# Patient Record
Sex: MALE | Race: OTHER | NOT HISPANIC OR LATINO | URBAN - METROPOLITAN AREA
[De-identification: names, ages, dates, MRNs, and addresses within clinical notes are randomized per-mention and may not be internally consistent; named-entity substitution may affect disease eponyms.]

---

## 2017-04-28 ENCOUNTER — INPATIENT (INPATIENT)
Facility: HOSPITAL | Age: 82
LOS: 2 days | Discharge: HOME HEALTH | DRG: 292 | End: 2017-05-01
Attending: INTERNAL MEDICINE | Admitting: INTERNAL MEDICINE
Payer: MEDICARE

## 2017-04-28 VITALS
HEIGHT: 70 IN | SYSTOLIC BLOOD PRESSURE: 112 MMHG | HEART RATE: 78 BPM | OXYGEN SATURATION: 94 % | DIASTOLIC BLOOD PRESSURE: 63 MMHG | WEIGHT: 160.06 LBS | TEMPERATURE: 97 F | RESPIRATION RATE: 18 BRPM

## 2017-04-28 DIAGNOSIS — R74.8 ABNORMAL LEVELS OF OTHER SERUM ENZYMES: ICD-10-CM

## 2017-04-28 DIAGNOSIS — E78.00 PURE HYPERCHOLESTEROLEMIA, UNSPECIFIED: ICD-10-CM

## 2017-04-28 DIAGNOSIS — Z29.9 ENCOUNTER FOR PROPHYLACTIC MEASURES, UNSPECIFIED: ICD-10-CM

## 2017-04-28 DIAGNOSIS — I10 ESSENTIAL (PRIMARY) HYPERTENSION: ICD-10-CM

## 2017-04-28 DIAGNOSIS — E87.1 HYPO-OSMOLALITY AND HYPONATREMIA: ICD-10-CM

## 2017-04-28 DIAGNOSIS — R06.00 DYSPNEA, UNSPECIFIED: ICD-10-CM

## 2017-04-28 DIAGNOSIS — I50.9 HEART FAILURE, UNSPECIFIED: ICD-10-CM

## 2017-04-28 DIAGNOSIS — R63.8 OTHER SYMPTOMS AND SIGNS CONCERNING FOOD AND FLUID INTAKE: ICD-10-CM

## 2017-04-28 DIAGNOSIS — R79.89 OTHER SPECIFIED ABNORMAL FINDINGS OF BLOOD CHEMISTRY: ICD-10-CM

## 2017-04-28 LAB
ALBUMIN SERPL ELPH-MCNC: 3.3 G/DL — LOW (ref 3.4–5)
ALP SERPL-CCNC: 183 U/L — HIGH (ref 40–120)
ALT FLD-CCNC: 76 U/L — HIGH (ref 12–42)
ANION GAP SERPL CALC-SCNC: 12 MMOL/L — SIGNIFICANT CHANGE UP (ref 9–16)
ANION GAP SERPL CALC-SCNC: 14 MMOL/L — SIGNIFICANT CHANGE UP (ref 9–16)
APPEARANCE UR: CLEAR — SIGNIFICANT CHANGE UP
APTT BLD: 28.6 SEC — SIGNIFICANT CHANGE UP (ref 27.5–37.4)
AST SERPL-CCNC: 54 U/L — HIGH (ref 15–37)
BASOPHILS NFR BLD AUTO: 0.3 % — SIGNIFICANT CHANGE UP (ref 0–2)
BILIRUB SERPL-MCNC: 1.4 MG/DL — HIGH (ref 0.2–1.2)
BILIRUB UR-MCNC: NEGATIVE — SIGNIFICANT CHANGE UP
BUN SERPL-MCNC: 12 MG/DL — SIGNIFICANT CHANGE UP (ref 7–23)
BUN SERPL-MCNC: 13 MG/DL — SIGNIFICANT CHANGE UP (ref 7–23)
CALCIUM SERPL-MCNC: 8.1 MG/DL — LOW (ref 8.5–10.5)
CALCIUM SERPL-MCNC: 8.2 MG/DL — LOW (ref 8.5–10.5)
CHLORIDE SERPL-SCNC: 81 MMOL/L — LOW (ref 96–108)
CHLORIDE SERPL-SCNC: 86 MMOL/L — LOW (ref 96–108)
CHLORIDE UR-SCNC: 159 MMOL/L — SIGNIFICANT CHANGE UP
CK MB BLD-MCNC: 3.11 % — SIGNIFICANT CHANGE UP
CK MB CFR SERPL CALC: 5.6 NG/ML — HIGH (ref 0.5–3.6)
CK SERPL-CCNC: 180 U/L — SIGNIFICANT CHANGE UP (ref 39–308)
CO2 SERPL-SCNC: 22 MMOL/L — SIGNIFICANT CHANGE UP (ref 22–31)
CO2 SERPL-SCNC: 24 MMOL/L — SIGNIFICANT CHANGE UP (ref 22–31)
COLOR SPEC: YELLOW — SIGNIFICANT CHANGE UP
CREAT ?TM UR-MCNC: 38.5 MG/DL — SIGNIFICANT CHANGE UP
CREAT SERPL-MCNC: 0.79 MG/DL — SIGNIFICANT CHANGE UP (ref 0.5–1.3)
CREAT SERPL-MCNC: 0.82 MG/DL — SIGNIFICANT CHANGE UP (ref 0.5–1.3)
DIFF PNL FLD: NEGATIVE — SIGNIFICANT CHANGE UP
EOSINOPHIL NFR BLD AUTO: 1.3 % — SIGNIFICANT CHANGE UP (ref 0–6)
GLUCOSE SERPL-MCNC: 102 MG/DL — HIGH (ref 70–99)
GLUCOSE SERPL-MCNC: 94 MG/DL — SIGNIFICANT CHANGE UP (ref 70–99)
GLUCOSE UR QL: NEGATIVE — SIGNIFICANT CHANGE UP
HCT VFR BLD CALC: 35.4 % — LOW (ref 39–50)
HGB BLD-MCNC: 12.4 G/DL — LOW (ref 13–17)
INR BLD: 1.22 — HIGH (ref 0.88–1.16)
KETONES UR-MCNC: NEGATIVE — SIGNIFICANT CHANGE UP
LEUKOCYTE ESTERASE UR-ACNC: NEGATIVE — SIGNIFICANT CHANGE UP
LYMPHOCYTES # BLD AUTO: 7.1 % — LOW (ref 13–44)
MAGNESIUM SERPL-MCNC: 1.8 MG/DL — SIGNIFICANT CHANGE UP (ref 1.6–2.4)
MCHC RBC-ENTMCNC: 32.6 PG — SIGNIFICANT CHANGE UP (ref 27–34)
MCHC RBC-ENTMCNC: 35 G/DL — SIGNIFICANT CHANGE UP (ref 32–36)
MCV RBC AUTO: 93.2 FL — SIGNIFICANT CHANGE UP (ref 80–100)
MONOCYTES NFR BLD AUTO: 11.1 % — SIGNIFICANT CHANGE UP (ref 2–14)
NEUTROPHILS NFR BLD AUTO: 80.2 % — HIGH (ref 43–77)
NITRITE UR-MCNC: NEGATIVE — SIGNIFICANT CHANGE UP
NT-PROBNP SERPL-SCNC: 3872 PG/ML — HIGH
OSMOLALITY UR: 427 MOSMOL/KG — SIGNIFICANT CHANGE UP (ref 100–650)
PH UR: 6 — SIGNIFICANT CHANGE UP (ref 5–8)
PLATELET # BLD AUTO: 179 K/UL — SIGNIFICANT CHANGE UP (ref 150–400)
POTASSIUM SERPL-MCNC: 3.5 MMOL/L — SIGNIFICANT CHANGE UP (ref 3.5–5.3)
POTASSIUM SERPL-MCNC: 4.3 MMOL/L — SIGNIFICANT CHANGE UP (ref 3.5–5.3)
POTASSIUM SERPL-SCNC: 3.5 MMOL/L — SIGNIFICANT CHANGE UP (ref 3.5–5.3)
POTASSIUM SERPL-SCNC: 4.3 MMOL/L — SIGNIFICANT CHANGE UP (ref 3.5–5.3)
POTASSIUM UR-SCNC: 36 MMOL/L — SIGNIFICANT CHANGE UP
PROT SERPL-MCNC: 6.9 G/DL — SIGNIFICANT CHANGE UP (ref 6.4–8.2)
PROT UR-MCNC: NEGATIVE MG/DL — SIGNIFICANT CHANGE UP
PROTHROM AB SERPL-ACNC: 13.6 SEC — HIGH (ref 9.8–12.7)
RBC # BLD: 3.8 M/UL — LOW (ref 4.2–5.8)
RBC # FLD: 13.8 % — SIGNIFICANT CHANGE UP (ref 10.3–16.9)
SODIUM SERPL-SCNC: 119 MMOL/L — CRITICAL LOW (ref 135–145)
SODIUM SERPL-SCNC: 120 MMOL/L — CRITICAL LOW (ref 135–145)
SODIUM UR-SCNC: 175 MMOL/L — SIGNIFICANT CHANGE UP
SP GR SPEC: 1.01 — SIGNIFICANT CHANGE UP (ref 1–1.03)
TROPONIN I SERPL-MCNC: 0.07 NG/ML — HIGH (ref 0.01–0.04)
TROPONIN I SERPL-MCNC: 0.07 NG/ML — HIGH (ref 0.01–0.04)
TSH SERPL-MCNC: 2.41 UIU/ML — SIGNIFICANT CHANGE UP (ref 0.35–4.94)
UROBILINOGEN FLD QL: 0.2 E.U./DL — SIGNIFICANT CHANGE UP
UUN UR-MCNC: 268 MG/DL — SIGNIFICANT CHANGE UP
WBC # BLD: 6.3 K/UL — SIGNIFICANT CHANGE UP (ref 3.8–10.5)
WBC # FLD AUTO: 6.3 K/UL — SIGNIFICANT CHANGE UP (ref 3.8–10.5)

## 2017-04-28 PROCEDURE — 93010 ELECTROCARDIOGRAM REPORT: CPT

## 2017-04-28 PROCEDURE — 99223 1ST HOSP IP/OBS HIGH 75: CPT

## 2017-04-28 PROCEDURE — 99285 EMERGENCY DEPT VISIT HI MDM: CPT | Mod: 25

## 2017-04-28 PROCEDURE — 71010: CPT | Mod: 26

## 2017-04-28 RX ORDER — FUROSEMIDE 40 MG
40 TABLET ORAL
Qty: 0 | Refills: 0 | Status: DISCONTINUED | OUTPATIENT
Start: 2017-04-28 | End: 2017-04-29

## 2017-04-28 RX ORDER — POTASSIUM CHLORIDE 20 MEQ
40 PACKET (EA) ORAL ONCE
Qty: 0 | Refills: 0 | Status: COMPLETED | OUTPATIENT
Start: 2017-04-28 | End: 2017-04-29

## 2017-04-28 RX ORDER — HEPARIN SODIUM 5000 [USP'U]/ML
5000 INJECTION INTRAVENOUS; SUBCUTANEOUS EVERY 8 HOURS
Qty: 0 | Refills: 0 | Status: DISCONTINUED | OUTPATIENT
Start: 2017-04-28 | End: 2017-04-29

## 2017-04-28 RX ORDER — FUROSEMIDE 40 MG
40 TABLET ORAL ONCE
Qty: 0 | Refills: 0 | Status: COMPLETED | OUTPATIENT
Start: 2017-04-28 | End: 2017-04-28

## 2017-04-28 RX ORDER — SODIUM CHLORIDE 9 MG/ML
3 INJECTION INTRAMUSCULAR; INTRAVENOUS; SUBCUTANEOUS ONCE
Qty: 0 | Refills: 0 | Status: COMPLETED | OUTPATIENT
Start: 2017-04-28 | End: 2017-04-28

## 2017-04-28 RX ORDER — DOCUSATE SODIUM 100 MG
100 CAPSULE ORAL THREE TIMES A DAY
Qty: 0 | Refills: 0 | Status: DISCONTINUED | OUTPATIENT
Start: 2017-04-28 | End: 2017-05-01

## 2017-04-28 RX ORDER — SENNA PLUS 8.6 MG/1
2 TABLET ORAL AT BEDTIME
Qty: 0 | Refills: 0 | Status: DISCONTINUED | OUTPATIENT
Start: 2017-04-28 | End: 2017-05-01

## 2017-04-28 RX ADMIN — Medication 40 MILLIGRAM(S): at 13:57

## 2017-04-28 RX ADMIN — SODIUM CHLORIDE 3 MILLILITER(S): 9 INJECTION INTRAMUSCULAR; INTRAVENOUS; SUBCUTANEOUS at 13:15

## 2017-04-28 RX ADMIN — HEPARIN SODIUM 5000 UNIT(S): 5000 INJECTION INTRAVENOUS; SUBCUTANEOUS at 21:43

## 2017-04-28 RX ADMIN — Medication 40 MILLIGRAM(S): at 21:43

## 2017-04-28 RX ADMIN — Medication 100 MILLIGRAM(S): at 21:43

## 2017-04-28 NOTE — CONSULT NOTE ADULT - PROBLEM SELECTOR RECOMMENDATION 9
Add on Serum Osmolality, TSH  Urine indices added on    Suspect decreased effective arterial volume from CHF leading to an ADH effect.   Anticipate improvement in serum sodium     Would check BMP q8 hours while being diuresed (see below)    Rate of sodium increase should be no more than 8mEq/L in 24 hours  No overt neurological symptoms right now  Chronicity unclear but no indication for hypertonic saline right now

## 2017-04-28 NOTE — H&P ADULT - ATTENDING COMMENTS
PATIENT SEEN AND EXAMINED.  AGREE WITH NOTE ABOVE. CONFIRMED WITH PATIENT.  SOB MILDLY IMPROVED SINCE ARRIVAL    P/E:SITTING COMFORTABLY IN BED, EATING  NO JVD, NO CAROTID BRUIT  MILD DEC BS AT BASES  NO S3, NO MURMUR  +BS, SOFT  +1 BILATERAL PITTING EDEMA    EKG: NSR, NO ISCHEMIC ST CHANGES

## 2017-04-28 NOTE — ED PROVIDER NOTE - OBJECTIVE STATEMENT
here with swelling of legs for the past week and a half and shortness of breath with exertion for about a week.  Denies chest pain, cough, fever/chills, n/v, similar symptoms in the past.  Prior MI > 10 yrs ago but never diagnosed with CHF.  Saw his doctor who did lab work showing low sodium of 121.

## 2017-04-28 NOTE — H&P ADULT - NSHPPHYSICALEXAM_GEN_ALL_CORE
.  VITAL SIGNS:  T(C): 36.8, Max: 36.8 (04-28 @ 15:11)  T(F): 98.2, Max: 98.2 (04-28 @ 15:11)  HR: 86 (78 - 86)  BP: 119/51 (112/63 - 125/51)  BP(mean): 91 (91 - 91)  RR: 16 (16 - 18)  SpO2: 95% (94% - 95%)  Wt(kg): --    PHYSICAL EXAM:    Constitutional: WDWN resting comfortably in bed; NAD  Head: NC/AT  Eyes: PERRL, EOMI, clear conjunctiva  ENT: no nasal discharge; uvula midline, no oropharyngeal erythema or exudates; MMM  Neck: supple; no JVD or thyromegaly  Respiratory: CTA B/L; no W/R/R, no retractions  Cardiac: +S1/S2; RRR; no M/R/G; PMI non-displaced  Gastrointestinal: soft, NT/ND; no rebound or guarding; +BSx4  Genitourinary: normal external genitalia  Back: spine midline, no bony tenderness or step-offs; no CVAT B/L  Extremities: WWP, no clubbing or cyanosis; no peripheral edema  Musculoskeletal: NROM x4; no joint swelling, tenderness or erythema  Vascular: 2+ radial, femoral, DP/PT pulses B/L  Dermatologic: skin warm, dry and intact; no rashes, wounds, or scars  Lymphatic: no submandibular or cervical LAD  Neurologic: AAOx3; CNII-XII grossly intact; no focal deficits  Psychiatric: affect and characteristics of appearance, verbalizations, behaviors are appropriate .  VITAL SIGNS:  T(C): 36.8, Max: 36.8 (04-28 @ 15:11)  T(F): 98.2, Max: 98.2 (04-28 @ 15:11)  HR: 86 (78 - 86)  BP: 119/51 (112/63 - 125/51)  BP(mean): 91 (91 - 91)  RR: 16 (16 - 18)  SpO2: 95% (94% - 95%)  Wt(kg): --    PHYSICAL EXAM:    Constitutional: elderly thin male, WDWN resting comfortably in bed; NAD, pleasant, conversive  Head: NC/AT  Eyes: PERRL, EOMI, clear conjunctiva  ENT: no nasal discharge; uvula midline, no oropharyngeal erythema or exudates; MMM  Neck: supple  Respiratory: bibasilar crackles: L>R  Cardiac: +S1/S2; RRR; no M/R/G; PMI non-displaced  Gastrointestinal: soft, NT/ND; no rebound or guarding; +BSx4  Extremities: cool extremities. 1+ pitting edema RLE; 2+ pitting edema LLE. negative Homans sign  Musculoskeletal: NROM x4; no joint swelling, tenderness. Mild erythematous macule on left lower anterior shin   Lymphatic: no submandibular or cervical LAD  Neurologic: AAOx3; CNII-XII grossly intact; no focal deficits  Psychiatric: affect and characteristics of appearance, verbalizations, behaviors are appropriate

## 2017-04-28 NOTE — H&P ADULT - ASSESSMENT
89-year-old male with history of HTN, hypercholesterolemia, and myocardial infarction approximately 18 years ago presenting from outpatient cardiologist's office with hyponatremia of 121 in the context of acute shortness of breath and leg swelling. Admitted to [facility] for evaluation of possible acute CHF exacerbation

## 2017-04-28 NOTE — H&P ADULT - NSHPLABSRESULTS_GEN_ALL_CORE
.  LABS:                         12.4   6.3   )-----------( 179      ( 2017 13:17 )             35.4         120<LL>  |  86<L>  |  13  ----------------------------<  102<H>  4.3   |  22  |  0.82    Ca    8.1<L>      2017 13:17  Mg     1.8         TPro  6.9  /  Alb  3.3<L>  /  TBili  1.4<H>  /  DBili  x   /  AST  54<H>  /  ALT  76<H>  /  AlkPhos  183<H>      PT/INR - ( 2017 13:17 )   PT: 13.6 sec;   INR: 1.22          PTT - ( 2017 13:17 )  PTT:28.6 sec  Urinalysis Basic - ( 2017 14:51 )    Color: Yellow / Appearance: Clear / S.010 / pH: x  Gluc: x / Ketone: NEGATIVE  / Bili: NEGATIVE / Urobili: 0.2 E.U./dL   Blood: x / Protein: NEGATIVE mg/dL / Nitrite: NEGATIVE   Leuk Esterase: NEGATIVE / RBC: x / WBC x   Sq Epi: x / Non Sq Epi: x / Bacteria: x      CARDIAC MARKERS ( 2017 13:17 )  0.065 ng/mL / x     / 180 U/L / x     / 5.6 ng/mL      Serum Pro-Brain Natriuretic Peptide: 3872 pg/mL ( @ 13:17)        RADIOLOGY, EKG & ADDITIONAL TESTS: Reviewed.

## 2017-04-28 NOTE — ED ADULT NURSE NOTE - OBJECTIVE STATEMENT
Patient reports feeling short of breath for 1 week with occasional cough "phlegm."  Denies chest pain.  "I noticed swelling in my legs two weeks ago."  EKG performed upon arrival.  IV access and labs in progress.  Patient placed on continuous cardiac monitoring and vital signs set.

## 2017-04-28 NOTE — ED PROVIDER NOTE - PMH
Acute myocardial infarction  20 years ago  Angina pectoris  since his MI 20 years ago. Can walk 3-4 blocks. Relieves with rest. Patient does not use nitroglycerin.  Cataract  bilateral; had surgery  Intestinal obstruction  May 2013, hospitalized for 8 days in [facility].

## 2017-04-28 NOTE — CONSULT NOTE ADULT - SUBJECTIVE AND OBJECTIVE BOX
Renal Consult placed by cardiologist Dr. Kilgore to Dr. Kimbrough for patient.         PAST MEDICAL & SURGICAL HISTORY:  Acute myocardial infarction: 20 years ago  Intestinal obstruction: May 2013, hospitalized for 8 days in [facility].  Angina pectoris: since his MI 20 years ago. Can walk 3-4 blocks. Relieves with rest. Patient does not use nitroglycerin.  Cataract: bilateral; had surgery  Cataract: Cataract      MEDICATIONS  (STANDING):    MEDICATIONS  (PRN):      Allergies    No Known Allergies    Intolerances        SOCIAL HISTORY:    FAMILY HISTORY:      T(C): , Max: 36.2 (04-28 @ 12:31)  T(F): , Max: 97.1 (04-28 @ 12:31)  HR: 78  BP: 112/63  BP(mean): --  RR: 18  SpO2: 94%  Wt(kg): --    Height (cm): 177.8 (04-28 @ 12:31)  Weight (kg): 72.6 (04-28 @ 12:31)  BMI (kg/m2): 23 (04-28 @ 12:31)  BSA (m2): 1.9 (04-28 @ 12:31)      LABS:                        12.4   6.3   )-----------( 179      ( 28 Apr 2017 13:17 )             35.4     04-28    120<LL>  |  86<L>  |  13  ----------------------------<  102<H>  4.3   |  22  |  0.82      TPro  6.9  /  Alb  3.3<L>  /  TBili  1.4<H>  /  DBili  x   /  AST  54<H>  /  ALT  76<H>  /  AlkPhos  183<H>  04-28    Creatine Kinase, Serum: 180 U/L [39 - 308] (04-28 @ 13:17)    PT/INR - ( 28 Apr 2017 13:17 )   PT: 13.6 sec;   INR: 1.22          PTT - ( 28 Apr 2017 13:17 )  PTT:28.6 sec          RADIOLOGY & ADDITIONAL STUDIES: Renal Consult placed by cardiologist Dr. Kilgore to Dr. Kimbrough for patient.     Patient has no previous renal history.    He is sent in by his cardiologist for evaluation for hyponatremia of 120 on outpatient labs.  He reports that over the 10 days or so, he has noted increasing lower extremity edema, exercise intolerance, and dyspnea on exertion.   Reports normal urination without lower urinary tract obstructive symptoms.   No headaches, no syncope, no seizures, no hand tremors, no change in mental status.  No nausea/vomiting  No report of decreased oral intake during this time.     He only recalls two medications he is on which is atenolol and ASA81.   Review of the pharmacy dispensed medication list in [place] also shows he picked up HCTZ 25mg POQD on 4/17/2017.     On seeing him in the ED, he is resting in bed comfortably. Reports no dyspnea at rest.   Received IV Lasix 40mg x 1 at the time of this consult. Urinal ready at bedside.         PAST MEDICAL & SURGICAL HISTORY:  Acute myocardial infarction: 20 years ago  Intestinal obstruction: May 2013, hospitalized for 8 days in [facility].  Angina pectoris: since his MI 20 years ago. Can walk 3-4 blocks. Relieves with rest. Patient does not use nitroglycerin.  Cataract: bilateral; had surgery  Cataract: Cataract      MEDICATIONS  (STANDING):    MEDICATIONS  (PRN):      Allergies    No Known Allergies    Intolerances        SOCIAL HISTORY:    FAMILY HISTORY:      T(C): , Max: 36.2 (04-28 @ 12:31)  T(F): , Max: 97.1 (04-28 @ 12:31)  HR: 78  BP: 112/63  BP(mean): --  RR: 18  SpO2: 94%  Wt(kg): --    Height (cm): 177.8 (04-28 @ 12:31)  Weight (kg): 72.6 (04-28 @ 12:31)  BMI (kg/m2): 23 (04-28 @ 12:31)  BSA (m2): 1.9 (04-28 @ 12:31)      LABS:                        12.4   6.3   )-----------( 179      ( 28 Apr 2017 13:17 )             35.4     04-28    120<LL>  |  86<L>  |  13  ----------------------------<  102<H>  4.3   |  22  |  0.82      TPro  6.9  /  Alb  3.3<L>  /  TBili  1.4<H>  /  DBili  x   /  AST  54<H>  /  ALT  76<H>  /  AlkPhos  183<H>  04-28    Creatine Kinase, Serum: 180 U/L [39 - 308] (04-28 @ 13:17)    PT/INR - ( 28 Apr 2017 13:17 )   PT: 13.6 sec;   INR: 1.22          PTT - ( 28 Apr 2017 13:17 )  PTT:28.6 sec          RADIOLOGY & ADDITIONAL STUDIES:      4/28: Appears to have mild interstitial edema; follow up official CXR read

## 2017-04-28 NOTE — CONSULT NOTE ADULT - PROBLEM SELECTOR RECOMMENDATION 2
Appears in clinical CHF  Diuresis  Would have patient on IV Lasix 40mg BID to start  Strict I&Os  Daily weights     Cardiomyopathy evaluation for cardiology

## 2017-04-28 NOTE — ED PROVIDER NOTE - MUSCULOSKELETAL, MLM
Spine appears normal, range of motion is not limited, no muscle or joint tenderness.  2+ pitting edema both legs

## 2017-04-28 NOTE — H&P ADULT - PROBLEM SELECTOR PLAN 2
Patient found to be hyponatremic to 121 per outpatient physician w/u. confirmed on admission. likely 2/2 fluid overload 2/2 acute CHF exacerbation. no role for aggressive correction as patient mentating well suggesting this is not an acute process  #Renal following; recs appreciated  -will likely correct with diuresis   #FeNa: 3.1 c/w intrinsic disease

## 2017-04-28 NOTE — ED ADULT NURSE NOTE - PMH
Acute myocardial infarction  20 years ago  Angina pectoris  since his MI 20 years ago. Can walk 3-4 blocks. Relieves with rest. Patient does not use nitroglycerin.  Cataract  bilateral; had surgery  Intestinal obstruction  May 2013, hospitalized for 8 days in [facility].

## 2017-04-28 NOTE — H&P ADULT - NSHPSOCIALHISTORY_GEN_ALL_CORE
Denies any history of smoking  Denies any illicit drug use  Prior occasional drinker.  Lives with wife

## 2017-04-28 NOTE — H&P ADULT - PROBLEM SELECTOR PLAN 6
Heparin subc    CODE: FULL    DISPO: admit to [facility] Patient with mildly elevated LFTs. no clear etiology at this time.   -will continue to monitor

## 2017-04-28 NOTE — H&P ADULT - HISTORY OF PRESENT ILLNESS
here with swelling of legs for the past week and a half and shortness of breath with exertion for about a week.  Denies chest pain, cough, fever/chills, n/v, similar symptoms in the past.  Prior MI > 10 yrs ago but never diagnosed with CHF.  Saw his doctor who did lab work showing low sodium of 121. 89-year-old male with history of HTN, hypercholesterolemia, and myocardial infarction approximately 18 years ago presenting from outpatient cardiologist's office with hyponatremia of 121 in the context of acute shortness of breath and leg swelling. Per patient, he noted bilateral foot swelling that began 1 week ago. He associated this with shortness of breath, and occasional cough with productive yellow sputum. He chronically has a 10 foot walking distance and can does not report any acute changes in his exercise tolerance. He can sleep laying flat and denies PND. He also notes increasing fatigue and increased urination over the last week. He denies fevers, chills, sick contacts, recent travels or recent changes in weight. He had his flu vaccine this season. He also denies any prior cardiac intervention or diagnosis of CHF.    In ED: T(F): 98.1; HR: 86 (78 - 86); BP: 119/51 (112/63 - 125/51); RR: 16 (16 - 18); SpO2: 95% (94% - 95%) on RA. Given lasix 40 IV x1. Labs significant for Fk=730. EKG=Sinus rhythm with 1st degree AV block. CXR: small bilateral basal pleural effusions.

## 2017-04-28 NOTE — H&P ADULT - PROBLEM SELECTOR PLAN 5
DASH/TLC  -replete electrolytes PRN: K>4; Mg>2 Patient with elevated troponin level. likely 2/2 demand  -will trend to peak

## 2017-04-28 NOTE — H&P ADULT - PROBLEM SELECTOR PLAN 1
Patient with history of HTN, hypercholesterolemia, prior MI 18 years ago presenting with SOB and b/l LE edema of 1 weeks' duration. CXR with b/l pleural effusions consistent with CHF.   #Admit to [facility] for telemetry monitoring  #f/u ECHO  #LE doppler to rule out DVT as LLE>LLE  #daily weights  #Strict I&Os  #consider starting standing diuretic Patient with history of HTN, hypercholesterolemia, prior MI 18 years ago presenting with SOB and b/l LE edema of 1 weeks' duration. CXR with b/l pleural effusions consistent with CHF.   #Admit to [facility] for telemetry monitoring  #f/u ECHO  #LE doppler to rule out DVT as LLE>LLE  #daily weights  #Strict I&Os  #start lasix 40 IV BID

## 2017-04-28 NOTE — ED PROVIDER NOTE - DIAGNOSTIC INTERPRETATION
CXR: vascular congestion, no focal consolidation, normal bones, normal cardiac contour.  read by Dr. Villalta

## 2017-04-28 NOTE — ED ADULT TRIAGE NOTE - CHIEF COMPLAINT QUOTE
Pt directed to ED by PCP CO SOB on exertion x1 week.  "I'm ok in the stretched but when I walk around I feel SOB"  Pt denies Dizziness, N/V/D, Fevers, and any Pain

## 2017-04-28 NOTE — ED PROVIDER NOTE - MEDICAL DECISION MAKING DETAILS
leg edema and dyspnea, suspect acute chf.  No history of similar per patient.  BNP elevated, cxr with congestion.  Labs also with low sodium but normal creatinine.  Renal consulted by pmd and at bedside.  Will give lasix for chf/volume overload, admit to tele.  Discussed with Dr. Lam.  ECHO ordered

## 2017-04-28 NOTE — CONSULT NOTE ADULT - ATTENDING COMMENTS
Appears to be related to decompensated heart failure  Poor renal perfusion results in increased ADH release  Review of historic labs over last yr show Na of 130 or so.  Assuming compensated at that time, may be related to hctz or other undisclosed medication      Given asymptomatic, treatment consists of loop diuretics  Changes in serum sodium more dramatic at this level of hyponatremia  As such, slow correction (as noted above) until Na is in low 130s is preferred with heightened vigilance for marked urine output unrelated to diuretics  Indicates that ADH secretion has fallen and rapid autocorrection may take place  Overnight renal fellow aware of case. Please contact directly if serum sodium rises more than 5 meq during any 8 hour period  D/w renal fellow.

## 2017-04-28 NOTE — H&P ADULT - PMH
Acute myocardial infarction  20 years ago  Angina pectoris  since his MI 20 years ago. Can walk 3-4 blocks. Relieves with rest. Patient does not use nitroglycerin.  Cataract  bilateral; had surgery  Intestinal obstruction  May 2013, hospitalized for 8 days in [facility].

## 2017-04-29 LAB
ANION GAP SERPL CALC-SCNC: 10 MMOL/L — SIGNIFICANT CHANGE UP (ref 9–16)
ANION GAP SERPL CALC-SCNC: 12 MMOL/L — SIGNIFICANT CHANGE UP (ref 9–16)
ANION GAP SERPL CALC-SCNC: 9 MMOL/L — SIGNIFICANT CHANGE UP (ref 9–16)
BUN SERPL-MCNC: 13 MG/DL — SIGNIFICANT CHANGE UP (ref 7–23)
BUN SERPL-MCNC: 13 MG/DL — SIGNIFICANT CHANGE UP (ref 7–23)
BUN SERPL-MCNC: 15 MG/DL — SIGNIFICANT CHANGE UP (ref 7–23)
CALCIUM SERPL-MCNC: 7.8 MG/DL — LOW (ref 8.5–10.5)
CALCIUM SERPL-MCNC: 8.1 MG/DL — LOW (ref 8.5–10.5)
CALCIUM SERPL-MCNC: 8.3 MG/DL — LOW (ref 8.5–10.5)
CHLORIDE SERPL-SCNC: 79 MMOL/L — LOW (ref 96–108)
CHLORIDE SERPL-SCNC: 80 MMOL/L — LOW (ref 96–108)
CHLORIDE SERPL-SCNC: 81 MMOL/L — LOW (ref 96–108)
CHLORIDE UR-SCNC: 120 MMOL/L — SIGNIFICANT CHANGE UP
CHOLEST SERPL-MCNC: 101 MG/DL — SIGNIFICANT CHANGE UP
CK MB BLD-MCNC: 2.12 % — SIGNIFICANT CHANGE UP
CK MB CFR SERPL CALC: 6.7 NG/ML — HIGH (ref 0.5–3.6)
CK SERPL-CCNC: 316 U/L — HIGH (ref 39–308)
CO2 SERPL-SCNC: 25 MMOL/L — SIGNIFICANT CHANGE UP (ref 22–31)
CO2 SERPL-SCNC: 28 MMOL/L — SIGNIFICANT CHANGE UP (ref 22–31)
CO2 SERPL-SCNC: 28 MMOL/L — SIGNIFICANT CHANGE UP (ref 22–31)
CREAT ?TM UR-MCNC: 20.6 MG/DL — SIGNIFICANT CHANGE UP
CREAT SERPL-MCNC: 0.8 MG/DL — SIGNIFICANT CHANGE UP (ref 0.5–1.3)
CREAT SERPL-MCNC: 0.94 MG/DL — SIGNIFICANT CHANGE UP (ref 0.5–1.3)
CREAT SERPL-MCNC: 0.96 MG/DL — SIGNIFICANT CHANGE UP (ref 0.5–1.3)
GLUCOSE SERPL-MCNC: 101 MG/DL — HIGH (ref 70–99)
GLUCOSE SERPL-MCNC: 121 MG/DL — HIGH (ref 70–99)
GLUCOSE SERPL-MCNC: 96 MG/DL — SIGNIFICANT CHANGE UP (ref 70–99)
HBA1C BLD-MCNC: 6 % — HIGH (ref 4.8–5.6)
HDLC SERPL-MCNC: 59 MG/DL — SIGNIFICANT CHANGE UP
LIPID PNL WITH DIRECT LDL SERPL: 34 MG/DL — SIGNIFICANT CHANGE UP
MAGNESIUM SERPL-MCNC: 1.6 MG/DL — SIGNIFICANT CHANGE UP (ref 1.6–2.4)
MAGNESIUM SERPL-MCNC: 2.1 MG/DL — SIGNIFICANT CHANGE UP (ref 1.6–2.4)
MAGNESIUM SERPL-MCNC: 2.7 MG/DL — HIGH (ref 1.6–2.4)
OSMOLALITY UR: 287 MOSMOL/KG — SIGNIFICANT CHANGE UP (ref 100–650)
PHOSPHATE SERPL-MCNC: 3.7 MG/DL — SIGNIFICANT CHANGE UP (ref 2.5–4.5)
POTASSIUM SERPL-MCNC: 3.6 MMOL/L — SIGNIFICANT CHANGE UP (ref 3.5–5.3)
POTASSIUM SERPL-MCNC: 3.6 MMOL/L — SIGNIFICANT CHANGE UP (ref 3.5–5.3)
POTASSIUM SERPL-MCNC: 4.1 MMOL/L — SIGNIFICANT CHANGE UP (ref 3.5–5.3)
POTASSIUM SERPL-SCNC: 3.6 MMOL/L — SIGNIFICANT CHANGE UP (ref 3.5–5.3)
POTASSIUM SERPL-SCNC: 3.6 MMOL/L — SIGNIFICANT CHANGE UP (ref 3.5–5.3)
POTASSIUM SERPL-SCNC: 4.1 MMOL/L — SIGNIFICANT CHANGE UP (ref 3.5–5.3)
POTASSIUM UR-SCNC: 31 MMOL/L — SIGNIFICANT CHANGE UP
SODIUM SERPL-SCNC: 117 MMOL/L — CRITICAL LOW (ref 135–145)
SODIUM SERPL-SCNC: 117 MMOL/L — CRITICAL LOW (ref 135–145)
SODIUM SERPL-SCNC: 118 MMOL/L — CRITICAL LOW (ref 135–145)
SODIUM UR-SCNC: 93 MMOL/L — SIGNIFICANT CHANGE UP
TOTAL CHOLESTEROL/HDL RATIO MEASUREMENT: 1.7 RATIO — SIGNIFICANT CHANGE UP
TRIGL SERPL-MCNC: 39 MG/DL — SIGNIFICANT CHANGE UP
TROPONIN I SERPL-MCNC: 0.12 NG/ML — HIGH (ref 0.01–0.04)
TROPONIN I SERPL-MCNC: 0.64 NG/ML — CRITICAL HIGH (ref 0.01–0.04)
TROPONIN I SERPL-MCNC: 0.73 NG/ML — CRITICAL HIGH (ref 0.01–0.04)
TSH SERPL-MCNC: 2.21 UIU/ML — SIGNIFICANT CHANGE UP (ref 0.35–4.94)
UUN UR-MCNC: 114 MG/DL — SIGNIFICANT CHANGE UP

## 2017-04-29 PROCEDURE — 93010 ELECTROCARDIOGRAM REPORT: CPT

## 2017-04-29 PROCEDURE — 99232 SBSQ HOSP IP/OBS MODERATE 35: CPT

## 2017-04-29 PROCEDURE — 93970 EXTREMITY STUDY: CPT | Mod: 26

## 2017-04-29 PROCEDURE — 93306 TTE W/DOPPLER COMPLETE: CPT | Mod: 26

## 2017-04-29 PROCEDURE — 99233 SBSQ HOSP IP/OBS HIGH 50: CPT

## 2017-04-29 RX ORDER — LANOLIN ALCOHOL/MO/W.PET/CERES
3 CREAM (GRAM) TOPICAL AT BEDTIME
Qty: 0 | Refills: 0 | Status: DISCONTINUED | OUTPATIENT
Start: 2017-04-29 | End: 2017-05-01

## 2017-04-29 RX ORDER — MAGNESIUM SULFATE 500 MG/ML
4 VIAL (ML) INJECTION ONCE
Qty: 0 | Refills: 0 | Status: COMPLETED | OUTPATIENT
Start: 2017-04-29 | End: 2017-04-29

## 2017-04-29 RX ORDER — HEPARIN SODIUM 5000 [USP'U]/ML
1400 INJECTION INTRAVENOUS; SUBCUTANEOUS
Qty: 25000 | Refills: 0 | Status: DISCONTINUED | OUTPATIENT
Start: 2017-04-29 | End: 2017-04-29

## 2017-04-29 RX ORDER — ACETAMINOPHEN 500 MG
650 TABLET ORAL EVERY 6 HOURS
Qty: 0 | Refills: 0 | Status: DISCONTINUED | OUTPATIENT
Start: 2017-04-29 | End: 2017-05-01

## 2017-04-29 RX ORDER — FUROSEMIDE 40 MG
40 TABLET ORAL EVERY 8 HOURS
Qty: 0 | Refills: 0 | Status: DISCONTINUED | OUTPATIENT
Start: 2017-04-30 | End: 2017-04-30

## 2017-04-29 RX ORDER — HEPARIN SODIUM 5000 [USP'U]/ML
5000 INJECTION INTRAVENOUS; SUBCUTANEOUS EVERY 8 HOURS
Qty: 0 | Refills: 0 | Status: DISCONTINUED | OUTPATIENT
Start: 2017-04-29 | End: 2017-05-01

## 2017-04-29 RX ORDER — POTASSIUM CHLORIDE 20 MEQ
40 PACKET (EA) ORAL ONCE
Qty: 0 | Refills: 0 | Status: COMPLETED | OUTPATIENT
Start: 2017-04-29 | End: 2017-04-29

## 2017-04-29 RX ADMIN — Medication 100 GRAM(S): at 09:46

## 2017-04-29 RX ADMIN — Medication 40 MILLIGRAM(S): at 17:13

## 2017-04-29 RX ADMIN — Medication 40 MILLIEQUIVALENT(S): at 00:00

## 2017-04-29 RX ADMIN — Medication 40 MILLIEQUIVALENT(S): at 23:45

## 2017-04-29 RX ADMIN — HEPARIN SODIUM 14 UNIT(S)/HR: 5000 INJECTION INTRAVENOUS; SUBCUTANEOUS at 06:07

## 2017-04-29 RX ADMIN — Medication 3 MILLIGRAM(S): at 01:24

## 2017-04-29 RX ADMIN — Medication 40 MILLIGRAM(S): at 23:45

## 2017-04-29 RX ADMIN — Medication 100 MILLIGRAM(S): at 06:44

## 2017-04-29 RX ADMIN — Medication 40 MILLIEQUIVALENT(S): at 07:51

## 2017-04-29 RX ADMIN — Medication 100 MILLIGRAM(S): at 21:51

## 2017-04-29 RX ADMIN — Medication 40 MILLIGRAM(S): at 06:44

## 2017-04-29 RX ADMIN — Medication 650 MILLIGRAM(S): at 09:20

## 2017-04-29 RX ADMIN — HEPARIN SODIUM 5000 UNIT(S): 5000 INJECTION INTRAVENOUS; SUBCUTANEOUS at 13:38

## 2017-04-29 RX ADMIN — HEPARIN SODIUM 5000 UNIT(S): 5000 INJECTION INTRAVENOUS; SUBCUTANEOUS at 21:51

## 2017-04-29 RX ADMIN — Medication 650 MILLIGRAM(S): at 08:35

## 2017-04-29 NOTE — PROGRESS NOTE ADULT - PROBLEM SELECTOR PLAN 5
Patient with elevated troponin level. likely 2/2 demand. continuing to trend up  -will trend to peak

## 2017-04-29 NOTE — PROGRESS NOTE ADULT - SUBJECTIVE AND OBJECTIVE BOX
Patient seen and examined at bedside. Patient was noted to have worsening in hyponatremia to 117 in the morning from 120. Patient continues to have decompensated heart failure. Patients is noted to be relatively hypotensive.     senna 2Tablet(s) at bedtime PRN  docusate sodium 100milliGRAM(s) three times a day  furosemide   Injectable 40milliGRAM(s) two times a day  melatonin 3milliGRAM(s) at bedtime PRN  acetaminophen   Tablet. 650milliGRAM(s) every 6 hours PRN  heparin  Injectable 5000Unit(s) every 8 hours    Allergies    No Known Allergies    Intolerances    T(C): , Max: 37.3 ( @ 09:52)  T(F): , Max: 99.2 ( @ 09:52)  HR: 70  BP: 108/66  BP(mean): 85  RR: 16  SpO2: 98%    I & Os for 24h ending  @ 07:00  =============================================  IN:    Oral Fluid: 180 ml    heparin Infusion: 28 ml    Total IN: 208 ml  ---------------------------------------------  OUT:    Voided: 4395 ml    Total OUT: 4395 ml  ---------------------------------------------  Total NET: -4187 ml    I & Os for current day (as of  @ 15:08)  =============================================  IN:    IV PiggyBack: 100 ml    heparin Infusion: 42 ml    Total IN: 142 ml  ---------------------------------------------  OUT:    Voided: 225 ml    Total OUT: 225 ml  ---------------------------------------------  Total NET: -83 ml    Height (cm): 180.3 ( @ 15:11)  Weight (kg): 83.4 ( 15:11)  BMI (kg/m2): 25.7 ( @ 15:11)  BSA (m2): 2.03 ( @ 15:11)    LABS:                        12.4   6.3   )-----------( 179      ( 2017 13:17 )             35.4         118<LL>  |  81<L>  |  13  ----------------------------<  121<H>  4.1   |  28  |  0.94    Ca    8.1<L>      2017 13:20  Phos  3.7       Mg     2.7         TPro  6.9  /  Alb  3.3<L>  /  TBili  1.4<H>  /  DBili  x   /  AST  54<H>  /  ALT  76<H>  /  AlkPhos  183<H>      Hemoglobin A1C, Whole Blood: 6.0 % <H> [4.8 - 5.6] ( @ 06:26)  Cholesterol, Serum: 101 mg/dL ( @ 06:26)    PT/INR - ( 2017 13:17 )   PT: 13.6 sec;   INR: 1.22          PTT - ( 2017 13:17 )  PTT:28.6 sec  Urinalysis Basic - ( 2017 14:51 )    Color: Yellow / Appearance: Clear / S.010 / pH: x  Gluc: x / Ketone: NEGATIVE  / Bili: NEGATIVE / Urobili: 0.2 E.U./dL   Blood: x / Protein: NEGATIVE mg/dL / Nitrite: NEGATIVE   Leuk Esterase: NEGATIVE / RBC: x / WBC x   Sq Epi: x / Non Sq Epi: x / Bacteria: x    Chloride, Random Urine: 120 mmoL/L ( @ 11:02)  Creatinine, Random Urine: 20.6 mg/dL ( @ 11:02)

## 2017-04-30 ENCOUNTER — TRANSCRIPTION ENCOUNTER (OUTPATIENT)
Age: 82
End: 2017-04-30

## 2017-04-30 DIAGNOSIS — I50.21 ACUTE SYSTOLIC (CONGESTIVE) HEART FAILURE: ICD-10-CM

## 2017-04-30 LAB
ANION GAP SERPL CALC-SCNC: 10 MMOL/L — SIGNIFICANT CHANGE UP (ref 9–16)
ANION GAP SERPL CALC-SCNC: 10 MMOL/L — SIGNIFICANT CHANGE UP (ref 9–16)
ANION GAP SERPL CALC-SCNC: 12 MMOL/L — SIGNIFICANT CHANGE UP (ref 9–16)
ANION GAP SERPL CALC-SCNC: 12 MMOL/L — SIGNIFICANT CHANGE UP (ref 9–16)
BUN SERPL-MCNC: 14 MG/DL — SIGNIFICANT CHANGE UP (ref 7–23)
BUN SERPL-MCNC: 15 MG/DL — SIGNIFICANT CHANGE UP (ref 7–23)
BUN SERPL-MCNC: 17 MG/DL — SIGNIFICANT CHANGE UP (ref 7–23)
BUN SERPL-MCNC: 17 MG/DL — SIGNIFICANT CHANGE UP (ref 7–23)
CALCIUM SERPL-MCNC: 7.9 MG/DL — LOW (ref 8.5–10.5)
CALCIUM SERPL-MCNC: 8.1 MG/DL — LOW (ref 8.5–10.5)
CALCIUM SERPL-MCNC: 8.1 MG/DL — LOW (ref 8.5–10.5)
CALCIUM SERPL-MCNC: 8.3 MG/DL — LOW (ref 8.5–10.5)
CHLORIDE SERPL-SCNC: 77 MMOL/L — LOW (ref 96–108)
CHLORIDE SERPL-SCNC: 79 MMOL/L — LOW (ref 96–108)
CHLORIDE SERPL-SCNC: 80 MMOL/L — LOW (ref 96–108)
CHLORIDE SERPL-SCNC: 81 MMOL/L — LOW (ref 96–108)
CO2 SERPL-SCNC: 27 MMOL/L — SIGNIFICANT CHANGE UP (ref 22–31)
CO2 SERPL-SCNC: 29 MMOL/L — SIGNIFICANT CHANGE UP (ref 22–31)
CREAT ?TM UR-MCNC: 27.9 MG/DL — SIGNIFICANT CHANGE UP
CREAT SERPL-MCNC: 0.9 MG/DL — SIGNIFICANT CHANGE UP (ref 0.5–1.3)
CREAT SERPL-MCNC: 0.93 MG/DL — SIGNIFICANT CHANGE UP (ref 0.5–1.3)
CREAT SERPL-MCNC: 0.98 MG/DL — SIGNIFICANT CHANGE UP (ref 0.5–1.3)
CREAT SERPL-MCNC: 1 MG/DL — SIGNIFICANT CHANGE UP (ref 0.5–1.3)
GLUCOSE SERPL-MCNC: 101 MG/DL — HIGH (ref 70–99)
GLUCOSE SERPL-MCNC: 118 MG/DL — HIGH (ref 70–99)
GLUCOSE SERPL-MCNC: 88 MG/DL — SIGNIFICANT CHANGE UP (ref 70–99)
GLUCOSE SERPL-MCNC: 90 MG/DL — SIGNIFICANT CHANGE UP (ref 70–99)
HCT VFR BLD CALC: 37.1 % — LOW (ref 39–50)
HGB BLD-MCNC: 13.1 G/DL — SIGNIFICANT CHANGE UP (ref 13–17)
MAGNESIUM SERPL-MCNC: 2.1 MG/DL — SIGNIFICANT CHANGE UP (ref 1.6–2.4)
MCHC RBC-ENTMCNC: 32.5 PG — SIGNIFICANT CHANGE UP (ref 27–34)
MCHC RBC-ENTMCNC: 35.3 G/DL — SIGNIFICANT CHANGE UP (ref 32–36)
MCV RBC AUTO: 92.1 FL — SIGNIFICANT CHANGE UP (ref 80–100)
OSMOLALITY SERPL: 247 MOSM/KG — LOW (ref 280–301)
OSMOLALITY UR: 264 MOSMOL/KG — SIGNIFICANT CHANGE UP (ref 100–650)
PHOSPHATE SERPL-MCNC: 3.5 MG/DL — SIGNIFICANT CHANGE UP (ref 2.5–4.5)
PLATELET # BLD AUTO: 200 K/UL — SIGNIFICANT CHANGE UP (ref 150–400)
POTASSIUM SERPL-MCNC: 3.6 MMOL/L — SIGNIFICANT CHANGE UP (ref 3.5–5.3)
POTASSIUM SERPL-MCNC: 3.7 MMOL/L — SIGNIFICANT CHANGE UP (ref 3.5–5.3)
POTASSIUM SERPL-MCNC: 3.8 MMOL/L — SIGNIFICANT CHANGE UP (ref 3.5–5.3)
POTASSIUM SERPL-MCNC: 3.9 MMOL/L — SIGNIFICANT CHANGE UP (ref 3.5–5.3)
POTASSIUM SERPL-SCNC: 3.6 MMOL/L — SIGNIFICANT CHANGE UP (ref 3.5–5.3)
POTASSIUM SERPL-SCNC: 3.7 MMOL/L — SIGNIFICANT CHANGE UP (ref 3.5–5.3)
POTASSIUM SERPL-SCNC: 3.8 MMOL/L — SIGNIFICANT CHANGE UP (ref 3.5–5.3)
POTASSIUM SERPL-SCNC: 3.9 MMOL/L — SIGNIFICANT CHANGE UP (ref 3.5–5.3)
POTASSIUM UR-SCNC: 20 MMOL/L — SIGNIFICANT CHANGE UP
RBC # BLD: 4.03 M/UL — LOW (ref 4.2–5.8)
RBC # FLD: 13.7 % — SIGNIFICANT CHANGE UP (ref 10.3–16.9)
SODIUM SERPL-SCNC: 118 MMOL/L — CRITICAL LOW (ref 135–145)
SODIUM SERPL-SCNC: 118 MMOL/L — CRITICAL LOW (ref 135–145)
SODIUM SERPL-SCNC: 119 MMOL/L — CRITICAL LOW (ref 135–145)
SODIUM SERPL-SCNC: 120 MMOL/L — CRITICAL LOW (ref 135–145)
SODIUM UR-SCNC: 80 MMOL/L — SIGNIFICANT CHANGE UP
TROPONIN I SERPL-MCNC: 0.55 NG/ML — HIGH (ref 0.01–0.04)
WBC # BLD: 6.3 K/UL — SIGNIFICANT CHANGE UP (ref 3.8–10.5)
WBC # FLD AUTO: 6.3 K/UL — SIGNIFICANT CHANGE UP (ref 3.8–10.5)

## 2017-04-30 PROCEDURE — 71020: CPT | Mod: 26

## 2017-04-30 PROCEDURE — 99232 SBSQ HOSP IP/OBS MODERATE 35: CPT

## 2017-04-30 PROCEDURE — 93010 ELECTROCARDIOGRAM REPORT: CPT

## 2017-04-30 PROCEDURE — 99233 SBSQ HOSP IP/OBS HIGH 50: CPT

## 2017-04-30 RX ORDER — POTASSIUM CHLORIDE 20 MEQ
20 PACKET (EA) ORAL ONCE
Qty: 0 | Refills: 0 | Status: COMPLETED | OUTPATIENT
Start: 2017-04-30 | End: 2017-04-30

## 2017-04-30 RX ORDER — ATORVASTATIN CALCIUM 80 MG/1
10 TABLET, FILM COATED ORAL AT BEDTIME
Qty: 0 | Refills: 0 | Status: DISCONTINUED | OUTPATIENT
Start: 2017-04-30 | End: 2017-05-01

## 2017-04-30 RX ORDER — CLOPIDOGREL BISULFATE 75 MG/1
300 TABLET, FILM COATED ORAL ONCE
Qty: 0 | Refills: 0 | Status: COMPLETED | OUTPATIENT
Start: 2017-04-30 | End: 2017-04-30

## 2017-04-30 RX ORDER — ASPIRIN/CALCIUM CARB/MAGNESIUM 324 MG
81 TABLET ORAL DAILY
Qty: 0 | Refills: 0 | Status: DISCONTINUED | OUTPATIENT
Start: 2017-04-30 | End: 2017-05-01

## 2017-04-30 RX ORDER — FUROSEMIDE 40 MG
40 TABLET ORAL DAILY
Qty: 0 | Refills: 0 | Status: DISCONTINUED | OUTPATIENT
Start: 2017-05-01 | End: 2017-05-01

## 2017-04-30 RX ORDER — BENZOCAINE AND MENTHOL 5; 1 G/100ML; G/100ML
1 LIQUID ORAL EVERY 4 HOURS
Qty: 0 | Refills: 0 | Status: DISCONTINUED | OUTPATIENT
Start: 2017-04-30 | End: 2017-05-01

## 2017-04-30 RX ADMIN — HEPARIN SODIUM 5000 UNIT(S): 5000 INJECTION INTRAVENOUS; SUBCUTANEOUS at 06:14

## 2017-04-30 RX ADMIN — HEPARIN SODIUM 5000 UNIT(S): 5000 INJECTION INTRAVENOUS; SUBCUTANEOUS at 21:27

## 2017-04-30 RX ADMIN — Medication 20 MILLIEQUIVALENT(S): at 18:16

## 2017-04-30 RX ADMIN — Medication 40 MILLIGRAM(S): at 07:25

## 2017-04-30 RX ADMIN — Medication 3 MILLIGRAM(S): at 21:28

## 2017-04-30 RX ADMIN — Medication 100 MILLIGRAM(S): at 13:39

## 2017-04-30 RX ADMIN — Medication 81 MILLIGRAM(S): at 12:15

## 2017-04-30 RX ADMIN — Medication 100 MILLIGRAM(S): at 06:14

## 2017-04-30 RX ADMIN — HEPARIN SODIUM 5000 UNIT(S): 5000 INJECTION INTRAVENOUS; SUBCUTANEOUS at 13:39

## 2017-04-30 RX ADMIN — Medication 100 MILLIGRAM(S): at 21:28

## 2017-04-30 RX ADMIN — CLOPIDOGREL BISULFATE 300 MILLIGRAM(S): 75 TABLET, FILM COATED ORAL at 12:15

## 2017-04-30 RX ADMIN — Medication 40 MILLIGRAM(S): at 16:44

## 2017-04-30 RX ADMIN — ATORVASTATIN CALCIUM 10 MILLIGRAM(S): 80 TABLET, FILM COATED ORAL at 21:28

## 2017-04-30 NOTE — DISCHARGE NOTE ADULT - CARE PLAN
Principal Discharge DX:	Acute systolic (congestive) heart failure Principal Discharge DX:	Acute systolic (congestive) heart failure  Secondary Diagnosis:	Hyponatremia  Secondary Diagnosis:	Hypertension  Goal:	maintain normal blood pressure  Instructions for follow-up, activity and diet:	You have a history of high blood pressure. please continue taking your medications as prescribed and follow up with Dr. Leija for continued management.  Secondary Diagnosis:	Hypercholesterolemia  Goal:	Follow up with Dr. Kilgore Principal Discharge DX:	Acute systolic (congestive) heart failure  Goal:	start lasix. follow up with Dr. Kilgore  Instructions for follow-up, activity and diet:	You have a known history of congestive heart failure prior to your admission. To manage this you are on the following medications: lasix 40mg daily by mouth.  Congestive heart failure can cause make it harder for your heart to pump blood to the rest of your body. As a result, blood can get backed up into your lungs or into your legs. In order to avoid this, make sure to take all of your medications for heart failure as prescribed. This will keep your heart functioning well. If you notice increased difficulty with breathing, cough with bloody mucous, increased swelling in the legs, or chest pain be sure to contact your PCP or call 911 as you may need more treatment. Additionally be sure to follow up with your PCP on a regular basis to make sure no additional medications or medication changes need to be made.  Secondary Diagnosis:	Hyponatremia  Goal:	follow up with Dr. Kilgore  Instructions for follow-up, activity and diet:	You were found to be hyponatremic as an outpatient which was likely secondary to your heart failure. As this is chronic our renal specialists suggested not to acutely correct the hyponatremia. Please follow up with Dr. Kilgore for further management.  Secondary Diagnosis:	Hypertension  Goal:	maintain normal blood pressure  Instructions for follow-up, activity and diet:	You have a history of high blood pressure. please continue taking your medications as prescribed and follow up with Dr. Leija for continued management.  Secondary Diagnosis:	Hypercholesterolemia  Goal:	Follow up with Dr. Kilgore

## 2017-04-30 NOTE — PROGRESS NOTE ADULT - SUBJECTIVE AND OBJECTIVE BOX
Mr. Avelar remains asymptomatic except for edema with hyponatremia and CHF. Continue fluid restriction

## 2017-04-30 NOTE — DISCHARGE NOTE ADULT - MEDICATION SUMMARY - MEDICATIONS TO STOP TAKING
I will STOP taking the medications listed below when I get home from the hospital:    atenolol 25 mg oral tablet  -- 1 tab(s) by mouth once a day

## 2017-04-30 NOTE — DISCHARGE NOTE ADULT - MEDICATION SUMMARY - MEDICATIONS TO TAKE
I will START or STAY ON the medications listed below when I get home from the hospital:    aspirin 81 mg oral tablet, chewable  -- 1 tab(s) by mouth once a day  -- Indication: For Hypercholesterolemia    atorvastatin 10 mg oral tablet  -- 1 tab(s) by mouth once a day (at bedtime)  -- Indication: For Hypercholesterolemia    Lasix 40 mg oral tablet  -- 1 tab(s) by mouth once a day  -- Avoid prolonged or excessive exposure to direct and/or artificial sunlight while taking this medication.  It is very important that you take or use this exactly as directed.  Do not skip doses or discontinue unless directed by your doctor.  It may be advisable to drink a full glass orange juice or eat a banana daily while taking this medication.    -- Indication: For ACUTE CHF

## 2017-04-30 NOTE — CHART NOTE - NSCHARTNOTEFT_GEN_A_CORE
RN notified me that pt's BP was 79/40. I went to bedside. Pt was afebrile, HR was 65s. Pt denied feeling dizzy, weak. Pt was mentating, AAOx2 (self, year) which is baseline for him per RN at bedside. Lungs were CTABL, LE with 1+ pitting edema. Pt had received lasix 40 IVP x2 today (dosed for lasix 40 q8h) and UOP was 300-350cc/hr. I repeated BP in 5 minutes and it was 89/52. Given the fact that pt was mentating well, did not give any fluids. I dc'ed lasix and re-ordered for lasix 40 IVP qday (to be discussed and changed per primary team in the AM)

## 2017-04-30 NOTE — DISCHARGE NOTE ADULT - CARE PROVIDERS DIRECT ADDRESSES
[email or web address]

## 2017-04-30 NOTE — PROGRESS NOTE ADULT - SUBJECTIVE AND OBJECTIVE BOX
INTERVAL HPI/OVERNIGHT EVENTS: Patient with ROSETTE O/N. Sodium still low but mentating well. Patient reports no CP/SOB. Patient comfortable.    VITAL SIGNS:  T(F): 97.9  HR: 84  BP: 93/48  RR: 16  SpO2: 95%  Wt(kg): --    PHYSICAL EXAM:    Constitutional: elderly thin male, WDWN resting comfortably in bed; NAD, pleasant, conversive  Head: NC/AT  Eyes: PERRL, EOMI, clear conjunctiva  ENT: no nasal discharge; uvula midline, no oropharyngeal erythema or exudates; MMM  Neck: supple  Respiratory: bibasilar crackles: L>R  Cardiac: +S1/S2; RRR; no M/R/G; PMI non-displaced  Gastrointestinal: soft, NT/ND; no rebound or guarding; +BSx4  Extremities: cool extremities. 1+ pitting edema RLE; 2+ pitting edema LLE. negative Homans sign  Musculoskeletal: NROM x4; no joint swelling, tenderness. Mild erythematous macule on left lower anterior shin   Lymphatic: no submandibular or cervical LAD  Neurologic: AAOx3; CNII-XII grossly intact; no focal deficits  Psychiatric: affect and characteristics of appearance, verbalizations, behaviors are appropriate    MEDICATIONS  (STANDING):  docusate sodium 100milliGRAM(s) Oral three times a day  heparin  Injectable 5000Unit(s) SubCutaneous every 8 hours  furosemide   Injectable 40milliGRAM(s) IV Push every 8 hours    MEDICATIONS  (PRN):  senna 2Tablet(s) Oral at bedtime PRN Constipation  melatonin 3milliGRAM(s) Oral at bedtime PRN Insomnia  acetaminophen   Tablet. 650milliGRAM(s) Oral every 6 hours PRN Moderate Pain (4 - 6)  benzocaine 15 mG/menthol 3.6 mG Lozenge 1Lozenge Oral every 4 hours PRN Sore Throat      Allergies    No Known Allergies    Intolerances            LABS:                        13.1   6.3   )-----------( 200      ( 2017 06:28 )             37.1     04-30    118<LL>  |  77<L>  |  14  ----------------------------<  88  3.6   |  29  |  0.93    Ca    8.1<L>      2017 06:27  Phos  3.5     -  Mg     2.1         TPro  6.9  /  Alb  3.3<L>  /  TBili  1.4<H>  /  DBili  x   /  AST  54<H>  /  ALT  76<H>  /  AlkPhos  183<H>      PT/INR - ( 2017 13:17 )   PT: 13.6 sec;   INR: 1.22          PTT - ( 2017 13:17 )  PTT:28.6 sec  Urinalysis Basic - ( 2017 14:51 )    Color: Yellow / Appearance: Clear / S.010 / pH: x  Gluc: x / Ketone: NEGATIVE  / Bili: NEGATIVE / Urobili: 0.2 E.U./dL   Blood: x / Protein: NEGATIVE mg/dL / Nitrite: NEGATIVE   Leuk Esterase: NEGATIVE / RBC: x / WBC x   Sq Epi: x / Non Sq Epi: x / Bacteria: x        RADIOLOGY & ADDITIONAL TESTS:

## 2017-04-30 NOTE — DISCHARGE NOTE ADULT - HOSPITAL COURSE
89-year-old male with history of HTN, hypercholesterolemia, and myocardial infarction approximately 18 years ago presenting from outpatient cardiologist's office with hyponatremia of 121 in the context of acute shortness of breath and leg swelling. Per patient, he noted bilateral foot swelling that began 1 week ago. He associated this with shortness of breath, and occasional cough with productive yellow sputum. He also notes increasing fatigue and increased urination over the last week. He also denies any prior cardiac intervention or diagnosis of CHF. In ED: T(F): 98.1; HR: 86 (78 - 86); BP: 119/51 (112/63 - 125/51); RR: 16 (16 - 18); SpO2: 95% (94% - 95%) on RA. Given lasix 40 IV x1. Labs significant for Pb=725. EKG=Sinus rhythm with 1st degree AV block. CXR: small bilateral basal pleural effusions.  ECHO: Normal LV size and wall thickness. basal inferolateral wall and inferior wall hypokinesis. apical septal and apical akinesis.  wall motion abnormalities are consistent with injury at multiple coronary artery territories.  EF=45%.  Patient responded well to diureses, now euvolemic.

## 2017-04-30 NOTE — DISCHARGE NOTE ADULT - CARE PROVIDER_API CALL
Reza Lam (DO), Cardiovascular Disease; Interventional Cardiology  [address] [place] [address]  Phone: [phone]  Fax: [phone] Reza Lam (DO), Cardiovascular Disease; Interventional Cardiology  [address] [place] [address]  Phone: [phone]  Fax: [phone]

## 2017-04-30 NOTE — DISCHARGE NOTE ADULT - PATIENT PORTAL LINK FT
“You can access the [employer] Patient Portal, offered by [facility], by registering with the following website: [email or web address]”

## 2017-04-30 NOTE — DISCHARGE NOTE ADULT - PLAN OF CARE
Follow up with Dr. Kilgore maintain normal blood pressure You have a history of high blood pressure. please continue taking your medications as prescribed and follow up with Dr. Leija for continued management. start lasix. follow up with Dr. Kilgore You have a known history of congestive heart failure prior to your admission. To manage this you are on the following medications: lasix 40mg daily by mouth.  Congestive heart failure can cause make it harder for your heart to pump blood to the rest of your body. As a result, blood can get backed up into your lungs or into your legs. In order to avoid this, make sure to take all of your medications for heart failure as prescribed. This will keep your heart functioning well. If you notice increased difficulty with breathing, cough with bloody mucous, increased swelling in the legs, or chest pain be sure to contact your PCP or call 911 as you may need more treatment. Additionally be sure to follow up with your PCP on a regular basis to make sure no additional medications or medication changes need to be made. follow up with Dr. Kilgore You were found to be hyponatremic as an outpatient which was likely secondary to your heart failure. As this is chronic our renal specialists suggested not to acutely correct the hyponatremia. Please follow up with Dr. Kilgore for further management.

## 2017-04-30 NOTE — PROGRESS NOTE ADULT - NEUROLOGICAL DETAILS
alert and oriented x 3/responds to verbal commands
alert and oriented x 3/responds to verbal commands

## 2017-04-30 NOTE — PROGRESS NOTE ADULT - SUBJECTIVE AND OBJECTIVE BOX
Patient seen and examined at bedside. Patient remains hyponatremic to 118. Patient continues to be in decompensated heart failure. Patient is relatively hypotensive.     senna 2Tablet(s) at bedtime PRN  docusate sodium 100milliGRAM(s) three times a day  melatonin 3milliGRAM(s) at bedtime PRN  acetaminophen   Tablet. 650milliGRAM(s) every 6 hours PRN  heparin  Injectable 5000Unit(s) every 8 hours  furosemide   Injectable 40milliGRAM(s) every 8 hours  benzocaine 15 mG/menthol 3.6 mG Lozenge 1Lozenge every 4 hours PRN  aspirin  chewable 81milliGRAM(s) daily  atorvastatin 10milliGRAM(s) at bedtime  clopidogrel Tablet 300milliGRAM(s) once    Allergies    No Known Allergies    Intolerances    T(C): , Max: 37.1 ( @ 15:02)  T(F): , Max: 98.7 ( @ 15:02)  HR: 84  BP: 93/48  BP(mean): 68  RR: 16  SpO2: 95%    I & Os for 24h ending  @ 07:00  =============================================  IN:    Oral Fluid: 800 ml    IV PiggyBack: 100 ml    heparin Infusion: 42 ml    Total IN: 942 ml  ---------------------------------------------  OUT:    Voided: 3125 ml    Total OUT: 3125 ml  ---------------------------------------------  Total NET: -2183 ml    I & Os for current day (as of  @ 12:05)  =============================================  IN:    Oral Fluid: 100 ml    Total IN: 100 ml  ---------------------------------------------  OUT:    Voided: 750 ml    Total OUT: 750 ml  ---------------------------------------------  Total NET: -650 ml    LABS:                        13.1   6.3   )-----------( 200      ( 2017 06:28 )             37.1         118<LL>  |  77<L>  |  14  ----------------------------<  88  3.6   |  29  |  0.93    Ca    8.1<L>      2017 06:27  Phos  3.5       Mg     2.1         TPro  6.9  /  Alb  3.3<L>  /  TBili  1.4<H>  /  DBili  x   /  AST  54<H>  /  ALT  76<H>  /  AlkPhos  183<H>      Osmolality, Serum: 247 mosm/kg <L> [280 - 301] ( @ 02:42)    PT/INR - ( 2017 13:17 )   PT: 13.6 sec;   INR: 1.22          PTT - ( 2017 13:17 )  PTT:28.6 sec  Urinalysis Basic - ( 2017 14:51 )    Color: Yellow / Appearance: Clear / S.010 / pH: x  Gluc: x / Ketone: NEGATIVE  / Bili: NEGATIVE / Urobili: 0.2 E.U./dL   Blood: x / Protein: NEGATIVE mg/dL / Nitrite: NEGATIVE   Leuk Esterase: NEGATIVE / RBC: x / WBC x   Sq Epi: x / Non Sq Epi: x / Bacteria: x    Creatinine, Random Urine: 27.9 mg/dL ( @ 00:42)  Sodium, Random Urine: 80 mmoL/L ( @ 00:42)

## 2017-05-01 VITALS — TEMPERATURE: 97 F

## 2017-05-01 PROCEDURE — 83880 ASSAY OF NATRIURETIC PEPTIDE: CPT

## 2017-05-01 PROCEDURE — 36415 COLL VENOUS BLD VENIPUNCTURE: CPT

## 2017-05-01 PROCEDURE — 81003 URINALYSIS AUTO W/O SCOPE: CPT

## 2017-05-01 PROCEDURE — 84443 ASSAY THYROID STIM HORMONE: CPT

## 2017-05-01 PROCEDURE — 80053 COMPREHEN METABOLIC PANEL: CPT

## 2017-05-01 PROCEDURE — 99232 SBSQ HOSP IP/OBS MODERATE 35: CPT | Mod: GC

## 2017-05-01 PROCEDURE — 82553 CREATINE MB FRACTION: CPT

## 2017-05-01 PROCEDURE — 82436 ASSAY OF URINE CHLORIDE: CPT

## 2017-05-01 PROCEDURE — 85025 COMPLETE CBC W/AUTO DIFF WBC: CPT

## 2017-05-01 PROCEDURE — 97161 PT EVAL LOW COMPLEX 20 MIN: CPT

## 2017-05-01 PROCEDURE — 93005 ELECTROCARDIOGRAM TRACING: CPT

## 2017-05-01 PROCEDURE — 99285 EMERGENCY DEPT VISIT HI MDM: CPT | Mod: 25

## 2017-05-01 PROCEDURE — 71045 X-RAY EXAM CHEST 1 VIEW: CPT

## 2017-05-01 PROCEDURE — 93306 TTE W/DOPPLER COMPLETE: CPT

## 2017-05-01 PROCEDURE — 85730 THROMBOPLASTIN TIME PARTIAL: CPT

## 2017-05-01 PROCEDURE — 93970 EXTREMITY STUDY: CPT

## 2017-05-01 PROCEDURE — 84540 ASSAY OF URINE/UREA-N: CPT

## 2017-05-01 PROCEDURE — 71046 X-RAY EXAM CHEST 2 VIEWS: CPT

## 2017-05-01 PROCEDURE — 85027 COMPLETE CBC AUTOMATED: CPT

## 2017-05-01 PROCEDURE — 84550 ASSAY OF BLOOD/URIC ACID: CPT

## 2017-05-01 PROCEDURE — 80048 BASIC METABOLIC PNL TOTAL CA: CPT

## 2017-05-01 PROCEDURE — 82550 ASSAY OF CK (CPK): CPT

## 2017-05-01 PROCEDURE — 99238 HOSP IP/OBS DSCHRG MGMT 30/<: CPT

## 2017-05-01 PROCEDURE — 85610 PROTHROMBIN TIME: CPT

## 2017-05-01 PROCEDURE — 84484 ASSAY OF TROPONIN QUANT: CPT

## 2017-05-01 PROCEDURE — 83036 HEMOGLOBIN GLYCOSYLATED A1C: CPT

## 2017-05-01 PROCEDURE — 96374 THER/PROPH/DIAG INJ IV PUSH: CPT

## 2017-05-01 PROCEDURE — 83930 ASSAY OF BLOOD OSMOLALITY: CPT

## 2017-05-01 PROCEDURE — 83735 ASSAY OF MAGNESIUM: CPT

## 2017-05-01 PROCEDURE — 84133 ASSAY OF URINE POTASSIUM: CPT

## 2017-05-01 PROCEDURE — 84300 ASSAY OF URINE SODIUM: CPT

## 2017-05-01 PROCEDURE — 80061 LIPID PANEL: CPT

## 2017-05-01 PROCEDURE — 84100 ASSAY OF PHOSPHORUS: CPT

## 2017-05-01 PROCEDURE — 83935 ASSAY OF URINE OSMOLALITY: CPT

## 2017-05-01 PROCEDURE — 82570 ASSAY OF URINE CREATININE: CPT

## 2017-05-01 RX ORDER — FUROSEMIDE 40 MG
1 TABLET ORAL
Qty: 30 | Refills: 0 | OUTPATIENT
Start: 2017-05-01 | End: 2017-05-31

## 2017-05-01 RX ORDER — ATENOLOL 25 MG/1
1 TABLET ORAL
Qty: 0 | Refills: 0 | COMMUNITY

## 2017-05-01 RX ORDER — ATENOLOL 25 MG/1
25 TABLET ORAL DAILY
Qty: 0 | Refills: 0 | Status: DISCONTINUED | OUTPATIENT
Start: 2017-05-01 | End: 2017-05-01

## 2017-05-01 RX ORDER — ASPIRIN/CALCIUM CARB/MAGNESIUM 324 MG
1 TABLET ORAL
Qty: 30 | Refills: 0 | OUTPATIENT
Start: 2017-05-01 | End: 2017-05-31

## 2017-05-01 RX ORDER — ATORVASTATIN CALCIUM 80 MG/1
1 TABLET, FILM COATED ORAL
Qty: 30 | Refills: 0 | OUTPATIENT
Start: 2017-05-01 | End: 2017-05-31

## 2017-05-01 RX ORDER — POTASSIUM CHLORIDE 20 MEQ
40 PACKET (EA) ORAL ONCE
Qty: 0 | Refills: 0 | Status: COMPLETED | OUTPATIENT
Start: 2017-05-01 | End: 2017-05-01

## 2017-05-01 RX ADMIN — Medication 100 MILLIGRAM(S): at 13:33

## 2017-05-01 RX ADMIN — Medication 81 MILLIGRAM(S): at 11:12

## 2017-05-01 RX ADMIN — Medication 100 MILLIGRAM(S): at 06:11

## 2017-05-01 RX ADMIN — BENZOCAINE AND MENTHOL 1 LOZENGE: 5; 1 LIQUID ORAL at 11:14

## 2017-05-01 RX ADMIN — BENZOCAINE AND MENTHOL 1 LOZENGE: 5; 1 LIQUID ORAL at 06:11

## 2017-05-01 RX ADMIN — Medication 40 MILLIGRAM(S): at 06:11

## 2017-05-01 RX ADMIN — HEPARIN SODIUM 5000 UNIT(S): 5000 INJECTION INTRAVENOUS; SUBCUTANEOUS at 13:34

## 2017-05-01 RX ADMIN — HEPARIN SODIUM 5000 UNIT(S): 5000 INJECTION INTRAVENOUS; SUBCUTANEOUS at 06:11

## 2017-05-01 RX ADMIN — Medication 40 MILLIEQUIVALENT(S): at 12:54

## 2017-05-01 RX ADMIN — ATENOLOL 25 MILLIGRAM(S): 25 TABLET ORAL at 11:12

## 2017-05-01 NOTE — PROGRESS NOTE ADULT - SUBJECTIVE AND OBJECTIVE BOX
CARDIOLOGY PGY-1 PROGRESS NOTE    O/N: hypotensive to 70s, held off on lasix -see chart note. Pt mentating fine. Na 119 at 2pm. Na 120 at 10PM.  BP now within normal limit.     Vitals:  T(C): 36.8, Max: 36.8 (05-01 @ 06:28)  HR: 64 (64 - 84)  BP: 109/64 (71/44 - 135/62)  RR: 14 (14 - 16)  SpO2: 96% (94% - 100%)  Wt(kg): --  I&O's Summary    I & Os for current day (as of 01 May 2017 07:50)  =============================================  IN: 220 ml / OUT: 1645 ml / NET: -1425 ml      Weight (kg): 67.5 (05-01 @ 06:43)    PHYSICAL EXAM:  Constitutional: elderly thin male, WDWN resting comfortably in bed; NAD, pleasant, conversive  Head: NC/AT  Eyes: PERRL, EOMI, clear conjunctiva  ENT: no nasal discharge; uvula midline, no oropharyngeal erythema or exudates; MMM  Neck: supple  Respiratory: bibasilar crackles: L>R  Cardiac: +S1/S2; RRR; no M/R/G; PMI non-displaced  Gastrointestinal: soft, NT/ND; no rebound or guarding; +BSx4  Extremities: cool extremities. 1+ pitting edema RLE; 2+ pitting edema LLE. negative Homans sign  Musculoskeletal: NROM x4; no joint swelling, tenderness. Mild erythematous macule on left lower anterior shin   Lymphatic: no submandibular or cervical LAD  Neurologic: AAOx3; CNII-XII grossly intact; no focal deficits  Psychiatric: affect and characteristics of appearance, verbalizations, behaviors are appropriate      TELEMETRY: 	    ECG:  	      DIAGNOSTIC TESTING:  [ ] Echocardiogram:  [ ]  Catheterization:  [ ] Stress Test:    OTHER: 	      CURRENT MEDICATIONS:  furosemide   Injectable 40milliGRAM(s) IV Push daily        melatonin 3milliGRAM(s) Oral at bedtime PRN  acetaminophen   Tablet. 650milliGRAM(s) Oral every 6 hours PRN    senna 2Tablet(s) Oral at bedtime PRN  docusate sodium 100milliGRAM(s) Oral three times a day    atorvastatin 10milliGRAM(s) Oral at bedtime    heparin  Injectable 5000Unit(s) SubCutaneous every 8 hours  benzocaine 15 mG/menthol 3.6 mG Lozenge 1Lozenge Oral every 4 hours PRN  aspirin  chewable 81milliGRAM(s) Oral daily      LABS:	 	    CARDIAC MARKERS:                                  13.1   6.3   )-----------( 200      ( 30 Apr 2017 06:28 )             37.1     04-30    120<LL>  |  81<L>  |  17  ----------------------------<  101<H>  3.7   |  29  |  1.00    Ca    8.1<L>      30 Apr 2017 22:28  Phos  3.5     04-30  Mg     2.1     04-30      proBNP:   Lipid Profile:   HgA1c:   TSH: CARDIOLOGY PGY-1 PROGRESS NOTE    O/N: hypotensive to 70s, held off on lasix -see chart note. Pt mentating fine. Na 119 at 2pm. Na 120 at 10PM.  BP now within normal limit.     Vitals:  T(C): 36.8, Max: 36.8 (05-01 @ 06:28)  HR: 64 (64 - 84)  BP: 109/64 (71/44 - 135/62)  RR: 14 (14 - 16)  SpO2: 96% (94% - 100%)  Wt(kg): --  I&O's Summary    I & Os for current day (as of 01 May 2017 07:50)  =============================================  IN: 220 ml / OUT: 1645 ml / NET: -1425 ml      Weight (kg): 67.5 (05-01 @ 06:43)    PHYSICAL EXAM:  Constitutional: elderly thin male, WDWN resting comfortably in bed; NAD, pleasant, conversive  Head: NC/AT  Eyes: PERRL, EOMI, clear conjunctiva  ENT: no nasal discharge; uvula midline, no oropharyngeal erythema or exudates; MMM  Neck: supple  Respiratory: CTAB  Cardiac: +S1/S2; RRR; no M/R/G; PMI non-displaced  Gastrointestinal: soft, NT/ND; no rebound or guarding; +BSx4  Extremities: cool extremities. trace b/l edema  Musculoskeletal: NROM x4; no joint swelling, tenderness. Mild erythematous macule on left lower anterior shin   Lymphatic: no submandibular or cervical LAD  Neurologic: AAOx3; CNII-XII grossly intact; no focal deficits  Psychiatric: affect and characteristics of appearance, verbalizations, behaviors are appropriate      TELEMETRY: 	    ECG:  	      DIAGNOSTIC TESTING:  [ ] Echocardiogram:  [ ]  Catheterization:  [ ] Stress Test:    OTHER: 	      CURRENT MEDICATIONS:  furosemide   Injectable 40milliGRAM(s) IV Push daily        melatonin 3milliGRAM(s) Oral at bedtime PRN  acetaminophen   Tablet. 650milliGRAM(s) Oral every 6 hours PRN    senna 2Tablet(s) Oral at bedtime PRN  docusate sodium 100milliGRAM(s) Oral three times a day    atorvastatin 10milliGRAM(s) Oral at bedtime    heparin  Injectable 5000Unit(s) SubCutaneous every 8 hours  benzocaine 15 mG/menthol 3.6 mG Lozenge 1Lozenge Oral every 4 hours PRN  aspirin  chewable 81milliGRAM(s) Oral daily      LABS:	 	    CARDIAC MARKERS:                                  13.1   6.3   )-----------( 200      ( 30 Apr 2017 06:28 )             37.1     04-30    120<LL>  |  81<L>  |  17  ----------------------------<  101<H>  3.7   |  29  |  1.00    Ca    8.1<L>      30 Apr 2017 22:28  Phos  3.5     04-30  Mg     2.1     04-30      proBNP:   Lipid Profile:   HgA1c:   TSH:

## 2017-05-01 NOTE — PROGRESS NOTE ADULT - PROBLEM SELECTOR PLAN 1
Hypervolemic hyponatremia likely due to CHF causing decreased effective arterial volume which is leading to an ADH effect. Discussed at length with patient about fluid restriction. Patient states he will try to do better at restricting fluids.     P - no indication for hypertonic saline at this time as patient is asymptomatic   would recommend to continue with furosemide 40 mg daily   please monitor strict I/Os   strict fluid restriction to 1L   please check urine lytes, urine osm, and serum osm  rate of sodium increase should be no more then 8 mEq/L in 24 hours   please monitor electrolytes bid
Patient with history of HTN, hypercholesterolemia, prior MI 18 years ago presenting with SOB and b/l LE edema of 1 weeks' duration. CXR with b/l pleural effusions consistent with CHF.   #f/u ECHO  #LE doppler to rule out DVT as LLE>LLE  #daily weights  #Strict I&Os  #c/w lasix 40 IV BID
Patient with history of HTN, hypercholesterolemia, prior MI 18 years ago presenting with SOB and b/l LE edema of 1 weeks' duration. CXR with b/l pleural effusions consistent with CHF.  #daily weights  #Strict I&Os  #c/w lasix 40 IV daily  LE dopplers negative
Patient with history of HTN, hypercholesterolemia, prior MI 18 years ago presenting with SOB and b/l LE edema of 1 weeks' duration. CXR with b/l pleural effusions consistent with CHF.  #daily weights  #Strict I&Os  #c/w lasix 40 IV q8hr  LE dopplers negative
Hypervolemic hyponatremia likely due to CHF causing decreased effective arterial volume which is leading to an ADH effect. Discussed at length with patient about fluid restriction. Patient states he will try to do better at restricting fluids. Patient is having positive free water clearance as urine electrolytes (Marie+Uk) < serum NA and urine osm < 300     P - no indication for hypertonic saline at this time as patient is asymptomatic   would recommend to continue with furosemide 40 mg q8h   please monitor strict I/Os   strict fluid restriction to 1L   please check urine lytes, urine osm, and serum osm  rate of sodium increase should be no more then 8 mEq/L in 24 hours   please monitor electrolytes q8h   (unable to use vaptan at this time since patient is relatively hypotensive)
Hypervolemic hyponatremia likely due to CHF causing decreased effective arterial volume which is leading to an ADH effect. Patient admits that he has been having increased fluid intake even though he is supposed to be fluid restricted.     P - no indication for hypertonic saline at this time as patient is asymptomatic   would recommend to increase furosemide to 40 mg IV q8h   please monitor strict I/Os   strict fluid restriction to 1L   please check urine lytes, urine osm, and serum osm  rate of sodium increase should be no more then 8 mEq/L in 24 hours   please monitor electrolytes q8h   (unable to use vaptan at this time since patient is relatively hypotensive)

## 2017-05-01 NOTE — PHYSICAL THERAPY INITIAL EVALUATION ADULT - GENERAL OBSERVATIONS, REHAB EVAL
patient received seated out of bed patient received semi-supine with no acute distress. +EKG, +Heplock

## 2017-05-01 NOTE — PROGRESS NOTE ADULT - ASSESSMENT
89-year-old male with history of HTN, hypercholesterolemia, and myocardial infarction approximately 18 years ago presenting from outpatient cardiologist's office with hyponatremia of 121 in the context of acute shortness of breath and leg swelling. Admitted to [facility] for evaluation of possible acute CHF exacerbation
Hyponatremia  Appears to be hypervolemic on exam

## 2017-05-01 NOTE — PROGRESS NOTE ADULT - ATTENDING COMMENTS
will add tolvaptan or conivaptan 5/2 or 5/3 if no improvement
PATIENT SEEN AND EXAMINED.      AGREE WITH NOTE ABOVE.    DISCUSSED WITH PATIENT AND RESIDENT.    RENAL INPUT APPRECIATED.  WILL CONSIDER TRANSFER TO MEDICINE SERVICE IF FURTHER IN PATIENT CARE REQUIRED IN 48 HOURS.
PATIENT SEEN AND EXAMINED  SODIUM UP   BREATHING AND LE EDEMA IMPROVED    IMPRESSION:  - RESOLVING SYSTOLIC HEART FAILURE EXACERBATION  - ELEVATED CARDIAC BIOMARKERS CONSISTENT WITH MYOCARDIAL STRAIN (NO CHEST PAIN)    REC:   - LASIX 40 PO DAILY  - ATENOLOL 25 MG DAILY  - ASA 81, PLAVIX 75  - OUT PATIENT FOLLOW UP WITH DR. VILA NEXT WEEK (SPOKE TO HIM ON THE PHONE)
Patient examined, fellow's hx and PE reviewed and confirmed. I find hyponatremia without mental status changes, fluid overload A/P reviewed and confirmed. Follow sodium. Fluid restrict. Check Александр UK. See fellow’s note
PATIENT SEEN AND EXAMINED.  CHART REVIEWED.  EKG REVIEWED.  LABS REVIEWED.  AGREE WITH NOTE ABOVE.  CONFIRMED WITH PATIENT.     P/E: NO JVD, BIBASILAR CRACKLES, NO S3, NO MURMUR, +BS, SOFT, +2 PITTING EDEMA    RENAL FOLLOWING.   - ASYMPTOMATIC  TROP ELEVATION LIKELY DUE TO CHF/STRAIN  EF 45%    CONTINUE IV DIURESIS  DISCUSSED CONSIDERATION OF CAD EVAL WITH PATIENT AND FAMILY - AT A LATER DATE.  THEY WISH TO DISCUSS WITH DR. VILA AND WILL DO SO AS AN OUT PATIENT.  WE WILL CONTACT DR. VILA ON MONDAY.

## 2017-05-01 NOTE — PROGRESS NOTE ADULT - PROBLEM SELECTOR PROBLEM 2
CHF (congestive heart failure)
Hyponatremia
CHF (congestive heart failure)
CHF (congestive heart failure)

## 2017-05-01 NOTE — PROGRESS NOTE ADULT - SUBJECTIVE AND OBJECTIVE BOX
Patient seen and examined at bedside.   aware pt has previously been on lasix tid and decreased  to daily 2/2 to hypotension  reports feeling well  denies nausea vomitting diarrhea  notes trying to decrease water intake however has sore throat.     senna 2Tablet(s) at bedtime PRN  docusate sodium 100milliGRAM(s) three times a day  melatonin 3milliGRAM(s) at bedtime PRN  acetaminophen   Tablet. 650milliGRAM(s) every 6 hours PRN  heparin  Injectable 5000Unit(s) every 8 hours  benzocaine 15 mG/menthol 3.6 mG Lozenge 1Lozenge every 4 hours PRN  aspirin  chewable 81milliGRAM(s) daily  atorvastatin 10milliGRAM(s) at bedtime  furosemide   Injectable 40milliGRAM(s) daily  ATENolol  Tablet 25milliGRAM(s) daily      Allergies    No Known Allergies    Intolerances        T(C): , Max: 36.8 (05-01 @ 06:28)  T(F): , Max: 98.2 (05-01 @ 06:28)  HR: 72  BP: 111/65  BP(mean): 82  RR: 16  SpO2: 93%  Wt(kg): --  I & Os for 24h ending 05-01 @ 07:00  =============================================  IN:    Oral Fluid: 220 ml    Total IN: 220 ml  ---------------------------------------------  OUT:    Voided: 1645 ml    Total OUT: 1645 ml  ---------------------------------------------  Total NET: -1425 ml    I & Os for current day (as of 05-01 @ 10:53)  =============================================  IN:    Oral Fluid: 180 ml    Total IN: 180 ml  ---------------------------------------------  OUT:    Voided: 1225 ml    Total OUT: 1225 ml  ---------------------------------------------  Total NET: -1045 ml      Weight (kg): 67.5 (05-01 @ 06:43)    PHYSICAL EXAM:  Constitutional: Well appearning.  No acute distress  ENMT: Moist mucous membrane.  No cyanosis.  Neck: Supple. No JVD.  Back: No CVA tenderness  Respiratory: Clear to auscultation   Cardiovascular: S1, S2.  Regular rate and rhythm.    Gastrointestinal: soft, non-tender, non-distended, normal bowel sounds  Extremities: Warm.  No lower extremity edema.    Neurological: No focal deficits.  Skin: Warm. Dry.    Lymph Nodes:  No cervical lymph nodes.    Psychiatric: Normal affect.      LABS:                        13.1   6.3   )-----------( 200      ( 30 Apr 2017 06:28 )             37.1     04-30    120<LL>  |  81<L>  |  17  ----------------------------<  101<H>  3.7   |  29  |  1.00    Ca    8.1<L>      30 Apr 2017 22:28  Phos  3.5     04-30  Mg     2.1     04-30            Creatinine, Random Urine: 27.9 mg/dL (04-30 @ 00:42)  Sodium, Random Urine: 80 mmoL/L (04-30 @ 00:42)        RADIOLOGY & ADDITIONAL STUDIES:

## 2017-05-01 NOTE — PROGRESS NOTE ADULT - PROBLEM SELECTOR PROBLEM 1
Acute systolic (congestive) heart failure
Acute systolic (congestive) heart failure
Dyspnea
Hyponatremia

## 2017-05-01 NOTE — PROGRESS NOTE ADULT - PROBLEM SELECTOR PLAN 3
Patient with history of HTN  -will hold home amlodipine as currently normotensive
well controlled c/w atenolol 25 mg bid
Not on any antihypertensive medications besides diuretic.   Monitor blood pressure closely
Not on any antihypertensive medications besides diuretic.   Monitor blood pressure closely

## 2017-05-01 NOTE — PROGRESS NOTE ADULT - PROBLEM SELECTOR PLAN 2
Patient found to be hyponatremic to 121 per outpatient physician w/u. confirmed on admission. likely 2/2 fluid overload 2/2 acute CHF exacerbation. no role for aggressive correction as patient mentating well suggesting this is not an acute process.  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
Patient found to be hyponatremic to 121 per outpatient physician w/u. confirmed on admission. likely 2/2 fluid overload 2/2 acute CHF exacerbation. no role for aggressive correction as patient mentating well suggesting this is not an acute process.  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
Patient found to be hyponatremic to 121 per outpatient physician w/u. confirmed on admission. likely 2/2 fluid overload 2/2 acute CHF exacerbation. no role for aggressive correction as patient mentating well suggesting this is not an acute process.  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
clinically in decompensated heart failure     P - continue furosemide to 40 mg IV daily   monitor strict I/Os   aim to keep patient net negative by 2L   daily weights
clinically in decompensated heart failure     P - continue furosemide to 40 mg IV q8h   monitor strict I/Os   aim to keep patient net negative by 2L   daily weights
clinically in decompensated heart failure     P - increase furosemide to 40 mg IV q8h   monitor strict I/Os   daily weights

## 2017-05-05 DIAGNOSIS — I25.2 OLD MYOCARDIAL INFARCTION: ICD-10-CM

## 2017-05-05 DIAGNOSIS — E87.1 HYPO-OSMOLALITY AND HYPONATREMIA: ICD-10-CM

## 2017-05-05 DIAGNOSIS — I20.9 ANGINA PECTORIS, UNSPECIFIED: ICD-10-CM

## 2017-05-05 DIAGNOSIS — I50.9 HEART FAILURE, UNSPECIFIED: ICD-10-CM

## 2017-05-05 DIAGNOSIS — I10 ESSENTIAL (PRIMARY) HYPERTENSION: ICD-10-CM

## 2017-05-05 DIAGNOSIS — I50.21 ACUTE SYSTOLIC (CONGESTIVE) HEART FAILURE: ICD-10-CM

## 2017-05-05 DIAGNOSIS — E78.5 HYPERLIPIDEMIA, UNSPECIFIED: ICD-10-CM

## 2017-05-05 DIAGNOSIS — I95.9 HYPOTENSION, UNSPECIFIED: ICD-10-CM

## 2017-10-17 ENCOUNTER — EMERGENCY (EMERGENCY)
Facility: HOSPITAL | Age: 82
LOS: 1 days | Discharge: HOME | End: 2017-10-17
Attending: EMERGENCY MEDICINE | Admitting: EMERGENCY MEDICINE
Payer: MEDICARE

## 2017-10-17 VITALS
RESPIRATION RATE: 16 BRPM | HEART RATE: 84 BPM | OXYGEN SATURATION: 96 % | SYSTOLIC BLOOD PRESSURE: 108 MMHG | DIASTOLIC BLOOD PRESSURE: 72 MMHG | TEMPERATURE: 98 F

## 2017-10-17 VITALS
SYSTOLIC BLOOD PRESSURE: 104 MMHG | OXYGEN SATURATION: 98 % | DIASTOLIC BLOOD PRESSURE: 72 MMHG | HEART RATE: 88 BPM | WEIGHT: 169.98 LBS | TEMPERATURE: 98 F | RESPIRATION RATE: 20 BRPM

## 2017-10-17 DIAGNOSIS — R60.0 LOCALIZED EDEMA: ICD-10-CM

## 2017-10-17 DIAGNOSIS — Z79.82 LONG TERM (CURRENT) USE OF ASPIRIN: ICD-10-CM

## 2017-10-17 DIAGNOSIS — M79.89 OTHER SPECIFIED SOFT TISSUE DISORDERS: ICD-10-CM

## 2017-10-17 DIAGNOSIS — I25.10 ATHEROSCLEROTIC HEART DISEASE OF NATIVE CORONARY ARTERY WITHOUT ANGINA PECTORIS: ICD-10-CM

## 2017-10-17 DIAGNOSIS — Z79.899 OTHER LONG TERM (CURRENT) DRUG THERAPY: ICD-10-CM

## 2017-10-17 LAB
ALBUMIN SERPL ELPH-MCNC: 3.8 G/DL — SIGNIFICANT CHANGE UP (ref 3.3–5)
ALP SERPL-CCNC: 191 U/L — HIGH (ref 40–120)
ALT FLD-CCNC: 36 U/L — SIGNIFICANT CHANGE UP (ref 10–45)
ANION GAP SERPL CALC-SCNC: 17 MMOL/L — SIGNIFICANT CHANGE UP (ref 5–17)
APPEARANCE UR: CLEAR — SIGNIFICANT CHANGE UP
APTT BLD: 27.2 SEC — LOW (ref 27.5–37.4)
AST SERPL-CCNC: 43 U/L — HIGH (ref 10–40)
BACTERIA # UR AUTO: PRESENT /HPF
BASOPHILS NFR BLD AUTO: 0.4 % — SIGNIFICANT CHANGE UP (ref 0–2)
BILIRUB SERPL-MCNC: 1.5 MG/DL — HIGH (ref 0.2–1.2)
BILIRUB UR-MCNC: NEGATIVE — SIGNIFICANT CHANGE UP
BUN SERPL-MCNC: 24 MG/DL — HIGH (ref 7–23)
CALCIUM SERPL-MCNC: 9.4 MG/DL — SIGNIFICANT CHANGE UP (ref 8.4–10.5)
CHLORIDE SERPL-SCNC: 86 MMOL/L — LOW (ref 96–108)
CO2 SERPL-SCNC: 22 MMOL/L — SIGNIFICANT CHANGE UP (ref 22–31)
COLOR SPEC: YELLOW — SIGNIFICANT CHANGE UP
CREAT SERPL-MCNC: 1.1 MG/DL — SIGNIFICANT CHANGE UP (ref 0.5–1.3)
DIFF PNL FLD: NEGATIVE — SIGNIFICANT CHANGE UP
EOSINOPHIL NFR BLD AUTO: 1.1 % — SIGNIFICANT CHANGE UP (ref 0–6)
EPI CELLS # UR: SIGNIFICANT CHANGE UP /HPF (ref 0–5)
GLUCOSE SERPL-MCNC: 100 MG/DL — HIGH (ref 70–99)
GLUCOSE UR QL: NEGATIVE — SIGNIFICANT CHANGE UP
HCT VFR BLD CALC: 38.2 % — LOW (ref 39–50)
HGB BLD-MCNC: 13.1 G/DL — SIGNIFICANT CHANGE UP (ref 13–17)
HYALINE CASTS # UR AUTO: (no result) /LPF (ref 0–2)
INR BLD: 1.32 — HIGH (ref 0.88–1.16)
KETONES UR-MCNC: (no result) MG/DL
LEUKOCYTE ESTERASE UR-ACNC: (no result)
LYMPHOCYTES # BLD AUTO: 11.1 % — LOW (ref 13–44)
MAGNESIUM SERPL-MCNC: 2 MG/DL — SIGNIFICANT CHANGE UP (ref 1.6–2.6)
MCHC RBC-ENTMCNC: 32.3 PG — SIGNIFICANT CHANGE UP (ref 27–34)
MCHC RBC-ENTMCNC: 34.3 G/DL — SIGNIFICANT CHANGE UP (ref 32–36)
MCV RBC AUTO: 94.1 FL — SIGNIFICANT CHANGE UP (ref 80–100)
MONOCYTES NFR BLD AUTO: 9.5 % — SIGNIFICANT CHANGE UP (ref 2–14)
NEUTROPHILS NFR BLD AUTO: 77.9 % — HIGH (ref 43–77)
NITRITE UR-MCNC: NEGATIVE — SIGNIFICANT CHANGE UP
PH UR: 6.5 — SIGNIFICANT CHANGE UP (ref 5–8)
PLATELET # BLD AUTO: 170 K/UL — SIGNIFICANT CHANGE UP (ref 150–400)
POTASSIUM SERPL-MCNC: 4.6 MMOL/L — SIGNIFICANT CHANGE UP (ref 3.5–5.3)
POTASSIUM SERPL-SCNC: 4.6 MMOL/L — SIGNIFICANT CHANGE UP (ref 3.5–5.3)
PROT SERPL-MCNC: 7.8 G/DL — SIGNIFICANT CHANGE UP (ref 6–8.3)
PROT UR-MCNC: 100 MG/DL
PROTHROM AB SERPL-ACNC: 14.7 SEC — HIGH (ref 9.8–12.7)
RBC # BLD: 4.06 M/UL — LOW (ref 4.2–5.8)
RBC # FLD: 13.6 % — SIGNIFICANT CHANGE UP (ref 10.3–16.9)
RBC CASTS # UR COMP ASSIST: < 5 /HPF — SIGNIFICANT CHANGE UP
SODIUM SERPL-SCNC: 125 MMOL/L — LOW (ref 135–145)
SP GR SPEC: 1.02 — SIGNIFICANT CHANGE UP (ref 1–1.03)
UROBILINOGEN FLD QL: 1 E.U./DL — SIGNIFICANT CHANGE UP
WBC # BLD: 5.6 K/UL — SIGNIFICANT CHANGE UP (ref 3.8–10.5)
WBC # FLD AUTO: 5.6 K/UL — SIGNIFICANT CHANGE UP (ref 3.8–10.5)
WBC UR QL: < 5 /HPF — SIGNIFICANT CHANGE UP

## 2017-10-17 PROCEDURE — 85730 THROMBOPLASTIN TIME PARTIAL: CPT

## 2017-10-17 PROCEDURE — 80053 COMPREHEN METABOLIC PANEL: CPT

## 2017-10-17 PROCEDURE — 36415 COLL VENOUS BLD VENIPUNCTURE: CPT

## 2017-10-17 PROCEDURE — 71010: CPT | Mod: 26

## 2017-10-17 PROCEDURE — 83735 ASSAY OF MAGNESIUM: CPT

## 2017-10-17 PROCEDURE — 93010 ELECTROCARDIOGRAM REPORT: CPT

## 2017-10-17 PROCEDURE — 71045 X-RAY EXAM CHEST 1 VIEW: CPT

## 2017-10-17 PROCEDURE — 93005 ELECTROCARDIOGRAM TRACING: CPT

## 2017-10-17 PROCEDURE — 99284 EMERGENCY DEPT VISIT MOD MDM: CPT | Mod: 25

## 2017-10-17 PROCEDURE — 99283 EMERGENCY DEPT VISIT LOW MDM: CPT | Mod: 25

## 2017-10-17 PROCEDURE — 82553 CREATINE MB FRACTION: CPT

## 2017-10-17 PROCEDURE — 83880 ASSAY OF NATRIURETIC PEPTIDE: CPT

## 2017-10-17 PROCEDURE — 85025 COMPLETE CBC W/AUTO DIFF WBC: CPT

## 2017-10-17 PROCEDURE — 85610 PROTHROMBIN TIME: CPT

## 2017-10-17 PROCEDURE — 87086 URINE CULTURE/COLONY COUNT: CPT

## 2017-10-17 PROCEDURE — 81001 URINALYSIS AUTO W/SCOPE: CPT

## 2017-10-17 PROCEDURE — 82550 ASSAY OF CK (CPK): CPT

## 2017-10-17 RX ORDER — SIMVASTATIN 20 MG/1
1 TABLET, FILM COATED ORAL
Qty: 0 | Refills: 0 | COMMUNITY

## 2017-10-17 NOTE — ED ADULT NURSE NOTE - OBJECTIVE STATEMENT
Patient brought in via wheelchair into bed #3, Dr. Kilgore told patient to come to ED for low sodium (patient does not know amount). Patient take HCTZ for CHF. No complaints at this time.

## 2017-10-17 NOTE — ED PROVIDER NOTE - NSTIMEPROVIDERCAREINITIATE_GEN_ER
17-Oct-2017 12:50 Have You Had A Chemical Peel Before?: has had a previous peel When Outside In The Sun, Do You...: mostly tans, rarely burns Additional History: Patient discontinued retinoid over one week ago

## 2017-10-17 NOTE — ED PROVIDER NOTE - CARDIAC, MLM
Normal rate, regular rhythm.  Heart sounds S1, S2. equal b/l LE edema noted, no pain with palpation.

## 2017-10-17 NOTE — ED ADULT NURSE NOTE - PMH
Acute myocardial infarction  20 years ago  Angina pectoris  since his MI 20 years ago. Can walk 3-4 blocks. Relieves with rest. Patient does not use nitroglycerin.  Cataract  bilateral; had surgery  CHF (congestive heart failure)    Intestinal obstruction  May 2013, hospitalized for 8 days in [facility].

## 2017-10-17 NOTE — ED ADULT NURSE NOTE - CHPI ED SYMPTOMS NEG
no back pain/no chest pain/no syncope/no chills/no diaphoresis/no cough/no dizziness/denies visual changes, numbness or tingling to extremities./no shortness of breath/no nausea/no fever/no vomiting

## 2017-10-17 NOTE — ED PROVIDER NOTE - PROGRESS NOTE DETAILS
Labs noted. Discussed with Dr. Kilgore and ot to f/up with him outpt. Labs / CXR noted. Findings discussed with Dr. Kilgore and pt to f/up with him outpt for treatment and optimization of care. .

## 2017-10-17 NOTE — ED PROVIDER NOTE - MEDICAL DECISION MAKING DETAILS
"dr. sotomayor said to come in has low sodium and leg swelling  swelling of lower extremities  Additional Complaints  c/o of lower leg swelling x few days, denies cp or sob. hx of CHF 89 yo male presents with b/l LE edea on and off X months and low sodium on outpt labs. No CP/SOB. Pt found to be hyponatremic in ED today with b/l LE edema with no pain. Non-toxic appearing. Case discussed with Dr. Kilgore. Pt does not need admission as he is stable and well-appearing and findings are chronic. To f/up outpt with Dr. Kilgore in a couple of days.

## 2017-10-17 NOTE — ED ADULT NURSE NOTE - ED CARDIAC CAPILLARY REFILL
2 seconds or less/+2 pitting edema to right lower extremity, +3 pitting to left lower extremity, dorsal pedal pulses good bilaterally.

## 2017-10-17 NOTE — ED PROVIDER NOTE - OBJECTIVE STATEMENT
[age] yo M with hx of CAD, CHF, cataracts presents with b/l lower extremity swelling on and off X few months and low sodium on outpatient blood work by Dr. Kilgore. Denies CP, SOB, abd pain, N,V,D, HA, change in mental status, neuro sxs. No other complaints.

## 2017-10-18 LAB
CULTURE RESULTS: NO GROWTH — SIGNIFICANT CHANGE UP
SPECIMEN SOURCE: SIGNIFICANT CHANGE UP

## 2018-02-26 NOTE — PHYSICAL THERAPY INITIAL EVALUATION ADULT - PERTINENT HX OF CURRENT PROBLEM, REHAB EVAL
89 year old male presented  from outpatient cardiologist's office with hyponatremia of 121 in the context of acute shortness of breath and leg swelling. Admitted to [facility] for evaluation of possible acute CHF exacerbation. Health Care Proxy (HCP)

## 2019-04-25 NOTE — ED PROVIDER NOTE - CPE EDP ENMT NORM
normal... High Dose Vitamin A Pregnancy And Lactation Text: High dose vitamin A therapy is contraindicated during pregnancy and breast feeding.

## 2020-11-18 NOTE — PHYSICAL THERAPY INITIAL EVALUATION ADULT - PLANNED THERAPY INTERVENTIONS, PT EVAL
DVT/PE: Lovenox 70meq daily  Code status DNR/DNI   GOC: Discussion for home hospice was ongoing with the family while patient was in rehabilitation prior to this event occurring and is still ongoing. Further discussion on PCU and comfort care is ongoing. transfer training/strengthening/balance training/bed mobility training/gait training
